# Patient Record
Sex: FEMALE | Race: BLACK OR AFRICAN AMERICAN | Employment: FULL TIME | URBAN - METROPOLITAN AREA
[De-identification: names, ages, dates, MRNs, and addresses within clinical notes are randomized per-mention and may not be internally consistent; named-entity substitution may affect disease eponyms.]

---

## 2017-09-16 ENCOUNTER — HOSPITAL ENCOUNTER (OUTPATIENT)
Dept: MAMMOGRAPHY | Age: 42
Discharge: HOME | End: 2017-09-16
Attending: PHYSICIAN ASSISTANT
Payer: COMMERCIAL

## 2017-09-16 DIAGNOSIS — Z12.31 VISIT FOR SCREENING MAMMOGRAM: ICD-10-CM

## 2017-09-16 PROCEDURE — 77067 SCR MAMMO BI INCL CAD: CPT

## 2018-08-09 ENCOUNTER — OFFICE VISIT (OUTPATIENT)
Dept: FAMILY MEDICINE CLINIC | Age: 43
End: 2018-08-09

## 2018-08-09 VITALS
WEIGHT: 262 LBS | RESPIRATION RATE: 14 BRPM | HEIGHT: 67 IN | DIASTOLIC BLOOD PRESSURE: 84 MMHG | TEMPERATURE: 98.7 F | BODY MASS INDEX: 41.12 KG/M2 | OXYGEN SATURATION: 97 % | HEART RATE: 71 BPM | SYSTOLIC BLOOD PRESSURE: 118 MMHG

## 2018-08-09 DIAGNOSIS — R10.84 GENERALIZED ABDOMINAL PAIN: Primary | ICD-10-CM

## 2018-08-09 DIAGNOSIS — C06.9 ORAL CANCER (HCC): ICD-10-CM

## 2018-08-09 DIAGNOSIS — Z13.220 SCREENING CHOLESTEROL LEVEL: ICD-10-CM

## 2018-08-09 DIAGNOSIS — M25.461 KNEE EFFUSION, RIGHT: ICD-10-CM

## 2018-08-09 DIAGNOSIS — Z13.1 SCREENING FOR DIABETES MELLITUS: ICD-10-CM

## 2018-08-09 DIAGNOSIS — Z87.19 H/O HERNIA REPAIR: ICD-10-CM

## 2018-08-09 DIAGNOSIS — E66.9 OBESITY, UNSPECIFIED CLASSIFICATION, UNSPECIFIED OBESITY TYPE, UNSPECIFIED WHETHER SERIOUS COMORBIDITY PRESENT: ICD-10-CM

## 2018-08-09 DIAGNOSIS — Z98.890 H/O HERNIA REPAIR: ICD-10-CM

## 2018-08-09 PROBLEM — E66.01 OBESITY, MORBID (HCC): Status: ACTIVE | Noted: 2018-08-09

## 2018-08-09 NOTE — PROGRESS NOTES
Chief Complaint   Patient presents with    New Patient    Abdominal Pain     upper abdominal pain when lifting or pulling; h/o hernia repair     Knee Pain     right; dx with Baker's cyst x 8 months ago      1. Have you been to the ER, urgent care clinic since your last visit? Hospitalized since your last visit? No    2. Have you seen or consulted any other health care providers outside of the [facility] since your last visit? Include any pap smears or colon screening.  No

## 2018-08-09 NOTE — MR AVS SNAPSHOT
[facility] [address] 
[phone] Patient: Moi Ríos MRN: [id number] TNY:[birth year] Visit Information Date & Time Provider Department Dept. Phone Encounter #  
 8/9/2018  3:00 PM Miguel An, [address] [place] [address] Road [id number] Follow-up Instructions Return in about 3 months (around 11/9/2018) for physical.  Fasting labs due 3-7 days prior to appointment . Upcoming Health Maintenance Date Due DTaP/Tdap/Td series (1 - Tdap) 3/3/1996 PAP AKA CERVICAL CYTOLOGY 3/3/1996 Influenza Age 5 to Adult 8/1/2018 BREAST CANCER SCRN MAMMOGRAM 9/16/2018 Allergies as of 8/9/2018  Review Complete On: 8/9/2018 By: Miguel An MD  
  
 Severity Noted Reaction Type Reactions Pcn [Penicillins]  08/09/2018    Hives Current Immunizations  Never Reviewed No immunizations on file. Not reviewed this visit You Were Diagnosed With   
  
 Codes Comments Generalized abdominal pain    -  Primary ICD-10-CM: R10.84 ICD-9-CM: 789.07   
 H/O hernia repair     ICD-10-CM: Z98.890, Z87.19 ICD-9-CM: V45.89 Knee effusion, right     ICD-10-CM: M25.461 ICD-9-CM: 719.06 Obesity, unspecified classification, unspecified obesity type, unspecified whether serious comorbidity present     ICD-10-CM: E66.9 ICD-9-CM: 278.00 Oral cancer ([facility] [address]     ICD-10-CM: C06.9 ICD-9-CM: 145.9 Screening cholesterol level     ICD-10-CM: D62.867 ICD-9-CM: V77.91 Screening for diabetes mellitus     ICD-10-CM: Z13.1 ICD-9-CM: V77.1 Vitals BP Pulse Temp Resp Height(growth percentile) Weight(growth percentile) 118/84 (BP 1 Location: Left arm, BP Patient Position: Sitting) 71 98.7 °F (37.1 °C) (Oral) 14 5' 6.5\" (1.689 m) 262 lb (118.8 kg) LMP SpO2 BMI OB Status Smoking Status 07/26/2018 (Exact Date) 97% 41.65 kg/m2 Having regular periods Never Smoker Vitals History BMI and BSA Data Body Mass Index Body Surface Area  
 41.65 kg/m 2 2.36 m 2 Preferred Pharmacy Pharmacy Name Phone [id number] Gerard Meyer, [address] [facility] [place], [place],[address] [facility] [phone] Your Updated Medication List  
  
Notice  As of 8/9/2018  4:00 PM  
 You have not been prescribed any medications. We Performed the Following REFERRAL TO GENERAL SURGERY [REF27 Custom] Follow-up Instructions Return in about 3 months (around 11/9/2018) for physical.  Fasting labs due 3-7 days prior to appointment . To-Do List   
 08/09/2018 Lab:  CBC W/O DIFF   
  
 08/09/2018 Lab:  HEMOGLOBIN A1C W/O EAG   
  
 08/09/2018 Lab:  LIPID PANEL   
  
 08/09/2018 Lab:  METABOLIC PANEL, COMPREHENSIVE Referral Information Referral ID Referred By Referred To  
  
 [id number] David Kirkland MD   
   [facility] [address] [place] [address] Phone: [phone] Fax: [phone] Visits Status Start Date End Date 1 New Request 8/9/18 8/9/19 If your referral has a status of pending review or denied, additional information will be sent to support the outcome of this decision. Patient Instructions A Healthy Lifestyle: Care Instructions Your Care Instructions A healthy lifestyle can help you feel good, stay at a healthy weight, and have plenty of energy for both work and play. A healthy lifestyle is something you can share with your whole family. A healthy lifestyle also can lower your risk for serious health problems, such as high blood pressure, heart disease, and diabetes. You can follow a few steps listed below to improve your health and the health of your family. Follow-up care is a key part of your treatment and safety.  Be sure to make and go to all appointments, and call your doctor if you are having problems. It's also a good idea to know your test results and keep a list of the medicines you take. How can you care for yourself at home? · Do not eat too much sugar, fat, or fast foods. You can still have dessert and treats now and then. The goal is moderation. · Start small to improve your eating habits. Pay attention to portion sizes, drink less juice and soda pop, and eat more fruits and vegetables. ¨ Eat a healthy amount of food. A 3-ounce serving of meat, for example, is about the size of a deck of cards. Fill the rest of your plate with vegetables and whole grains. ¨ Limit the amount of soda and sports drinks you have every day. Drink more water when you are thirsty. ¨ Eat at least 5 servings of fruits and vegetables every day. It may seem like a lot, but it is not hard to reach this goal. A serving or helping is 1 piece of fruit, 1 cup of vegetables, or 2 cups of leafy, raw vegetables. Have an apple or some carrot sticks as an afternoon snack instead of a candy bar. Try to have fruits and/or vegetables at every meal. 
· Make exercise part of your daily routine. You may want to start with simple activities, such as walking, bicycling, or slow swimming. Try to be active 30 to 60 minutes every day. You do not need to do all 30 to 60 minutes all at once. For example, you can exercise 3 times a day for 10 or 20 minutes. Moderate exercise is safe for most people, but it is always a good idea to talk to your doctor before starting an exercise program. 
· Keep moving. Gee Leavittus the lawn, work in the garden, or [employer]. Take the stairs instead of the elevator at work. · If you smoke, quit. People who smoke have an increased risk for heart attack, stroke, cancer, and other lung illnesses. Quitting is hard, but there are ways to boost your chance of quitting tobacco for good. ¨ Use nicotine gum, patches, or lozenges. ¨ Ask your doctor about stop-smoking programs and medicines. ¨ Keep trying. In addition to reducing your risk of diseases in the future, you will notice some benefits soon after you stop using tobacco. If you have shortness of breath or asthma symptoms, they will likely get better within a few weeks after you quit. · Limit how much alcohol you drink. Moderate amounts of alcohol (up to 2 drinks a day for men, 1 drink a day for women) are okay. But drinking too much can lead to liver problems, high blood pressure, and other health problems. Family health If you have a family, there are many things you can do together to improve your health. · Eat meals together as a family as often as possible. · Eat healthy foods. This includes fruits, vegetables, lean meats and dairy, and whole grains. · Include your family in your fitness plan. Most people think of activities such as jogging or tennis as the way to fitness, but there are many ways you and your family can be more active. Anything that makes you breathe hard and gets your heart pumping is exercise. Here are some tips: 
¨ Walk to do errands or to take your child to school or the bus. ¨ Go for a family bike ride after dinner instead of watching TV. Where can you learn more? Go to http://jey-kathie.info/. Enter T009 in the search box to learn more about \"A Healthy Lifestyle: Care Instructions. \" Current as of: December 7, 2017 Content Version: 11.7 © 6121-7563 [employer]. Care instructions adapted under license by [employer] (which disclaims liability or warranty for this information). If you have questions about a medical condition or this instruction, always ask your healthcare professional. [place] [address] any warranty or liability for your use of this information. [address], [address] [address] [address] Phone: [phone] Introducing [facility]!    
 [facility] introduces [facility] patient portal. Now you can access parts of your medical record, email your doctor's office, and request medication refills online. 1. In your internet browser, go to [email or web address] 2. Click on the First Time User? Click Here link in the Sign In box. You will see the New Member Sign Up page. 3. Enter your [employer] Access Code exactly as it appears below. You will not need to use this code after youve completed the sign-up process. If you do not sign up before the expiration date, you must request a new code. · [employer] Access Code: [email or web address] Expires: 11/7/2018  2:58 PM 
 
4. Enter the last four digits of your Social Security Number (xxxx) and Date of Birth (mm/dd/yyyy) as indicated and click Submit. You will be taken to the next sign-up page. 5. Create a [employer] ID. This will be your [employer] login ID and cannot be changed, so think of one that is secure and easy to remember. 6. Create a [employer] password. You can change your password at any time. 7. Enter your Password Reset Question and Answer. This can be used at a later time if you forget your password. 8. Enter your e-mail address. You will receive e-mail notification when new information is available in [address] 9. Click Sign Up. You can now view and download portions of your medical record. 10. Click the Download Summary menu link to download a portable copy of your medical information. If you have questions, please visit the Frequently Asked Questions section of the [employer] website. Remember, [employer] is NOT to be used for urgent needs. For medical emergencies, dial 911. Now available from your iPhone and Android! Please provide this summary of care documentation to your next provider. Your primary care clinician is listed as Amaliaoll Sol. If you have any questions after today's visit, please call [phone].

## 2018-08-09 NOTE — PATIENT INSTRUCTIONS

## 2018-08-10 NOTE — PROGRESS NOTES
SUBJECTIVE  Chief Complaint   Patient presents with    New Patient    Abdominal Pain     upper abdominal pain when lifting or pulling; h/o hernia repair     Knee Pain     right; dx with Baker's cyst x 8 months ago      Patient presents for a few issues and to establish care. Patient presents complaining of a several week onset of left sided upper abdominal pain. Quality: pulling, dull, intermittent  Radiation: denies   Aggravating factors: when lifting or exerting herself. No chest pain or dyspnea. Relieving factors: rest  Has tried: avoiding aggravating activities   History of abdominal pain: yes, had 2 hernia repairs in the area of her pain and at that time had the exact symptoms. History of abdominal surgery: hernia repairs  The patient denies any melena or hematochezia. The patient denies diarrhea or constipation. The patient denies any nausea or vomiting. The patient denies any fevers. The patient presents complaining of an 8 month history of right knee pain. Location: entire knee  Quality: sore, swollen  Injury: denies   Duration: several months   Radiation: denies   Swelling: yes  Instability: is sometimes giving out, thus feeling a loss of coordination   Locking: denies   Aggravating factors: too much activity   Relieving factors: rest  Has tried: went to urgent care and had films. Also saw her prior PCP. Had an injection with some relief. Past Medical History:   Diagnosis Date    H/O oral cancer 2016    Obesity      No current outpatient prescriptions on file.     Allergies   Allergen Reactions    Pcn [Penicillins] Hives     Past Surgical History:   Procedure Laterality Date    HX  SECTION      HX HEENT      Tumor removed from mouth - Dr. Terrie Kenyon, Dr. Marsha Coreas, 2007    used mesh second time, does not recall surgery    HX TONSILLECTOMY       Social History     Social History    Marital status:      Spouse name: N/A  Number of children: N/A    Years of education: N/A     Occupational History          Social History Main Topics    Smoking status: Never Smoker    Smokeless tobacco: Never Used    Alcohol use Yes      Comment: rarely    Drug use: Not on file    Sexual activity: Yes     Partners: Male     Other Topics Concern    Not on file     Social History Narrative    No narrative on file     History reviewed. No pertinent family history. ROS:  Complete 10 system ROS is obtained from the patient intake forms which are reviewed with the patient. The intake H&P is scanned in for the chart. OBJECTIVE    Blood pressure 118/84, pulse 71, temperature 98.7 °F (37.1 °C), temperature source Oral, resp. rate 14, height 5' 6.5\" (1.689 m), weight 262 lb (118.8 kg), last menstrual period 07/26/2018, SpO2 97 %. General:  Alert, cooperative, well appearing, in no apparent distress. Head:  Head is symmetric, normocephalic without evidence of trauma or deformity. Eyes:  Pupils are equally round and reactive to light with accommodation. The extra-ocular movements are intact. The lids are without swelling, lesions, or drainage. The conjunctiva are clear and noninjected. ENT:  The external auditory canals are without swelling or drainage. The nasal mucosa is pink without drainage. The tongue and mucous membranes are pink and moist without lesions. Neck:  There is normal range of motion. The thyroid is normal size without nodules or masses. There is no lymphadenopathy. There are no carotid bruits. CV:  The heart sounds are regular in rate and rhythm. There is a normal S1 and S2. There or no murmurs. Lungs: Inspiratory and expiratory efforts are full and unlabored. Lung sounds are clear and equal to auscultation throughout all lung fields without wheezing, rales, or rhonchi. Abd: soft, nontender, nondistended. No masses.   When she starts to sit up from a supine position, she has some mild swelling in the area of her pain that is to the left of the midline in her epigastric / LUQ area. Skin:  No rashes, no jaundice. Right knee:  Trace effusion. Nontender. No laxity. Gait is normal.    Psych: normal affect. Mood good. Oriented x 3. Judgement and insight intact. ASSESSMENT / PLAN    ICD-10-CM ICD-9-CM    1. Generalized abdominal pain R10.84 789.07 REFERRAL TO GENERAL SURGERY      CBC W/O DIFF      METABOLIC PANEL, COMPREHENSIVE   2. H/O hernia repair Z98.890 V45.89 REFERRAL TO GENERAL SURGERY    Z87.19     3. Knee effusion, right M25.461 719.06    4. Obesity, unspecified classification, unspecified obesity type, unspecified whether serious comorbidity present E66.9 278.00 HEMOGLOBIN A1C W/O EAG      LIPID PANEL   5. Oral cancer (HCC) C06.9 145.9    6. Screening cholesterol level Z13.220 V77.91 LIPID PANEL   7. Screening for diabetes mellitus Z13.1 V77.1 HEMOGLOBIN A1C W/O EAG     Abd pain - possibly due to recurrent hernia. Will refer for a surgery opinion. Knee effusion - likely the best initial option since her corticosteroid injection was not providing sustained relief, she should work on quad strengthening, weight loss, and home modalities like ice. I offered her formal PT or orthopedic referrals as well. A neoprene knee sleeve may be worthwhile. HEP handout provided. Obesity - diet and exercise. History of oral cancer - stated she has had an oral lesion removed that was cancerous and is seeing [facility]. We are requesting notes for review to clarify the diagnosis. Screening labs ordered. All chart history elements were reviewed by me at the time of the visit even though marked at time of note closure. Patient understands our medical plan. Patient has provided input and agrees with goals. Alternatives have been explained and offered. All questions answered. The patient is to call if condition worsens or fails to improve.      Follow-up Disposition:  Return in about 3 months (around 11/9/2018) for physical.  Fasting labs due 3-7 days prior to appointment .

## 2018-08-17 ENCOUNTER — OFFICE VISIT (OUTPATIENT)
Dept: SURGERY | Age: 43
End: 2018-08-17

## 2018-08-17 VITALS
RESPIRATION RATE: 16 BRPM | HEIGHT: 65 IN | OXYGEN SATURATION: 98 % | DIASTOLIC BLOOD PRESSURE: 74 MMHG | TEMPERATURE: 98.2 F | BODY MASS INDEX: 43.32 KG/M2 | HEART RATE: 76 BPM | SYSTOLIC BLOOD PRESSURE: 126 MMHG | WEIGHT: 260 LBS

## 2018-08-17 DIAGNOSIS — R10.33 PERIUMBILICAL ABDOMINAL PAIN: Primary | ICD-10-CM

## 2018-08-17 NOTE — PROGRESS NOTES
1. Have you been to the ER, urgent care clinic since your last visit? Hospitalized since your last visit? No    2. Have you seen or consulted any other health care providers outside of the [address] [place] [address] since your last visit? Include any pap smears or colon screening.  No

## 2018-08-17 NOTE — PROGRESS NOTES
General Surgery Consult      Humaira Marcus  Admit date: (Not on file)    MRN: [id number]     : 1975     Age: 37 y.o. Attending Physician: Juliana Saavedra MD [facility]      History of Present Illness:     Humaira Marcus is a 37 y.o. female was referred  for evaluation of a possible umbilical hernia. The patient stated that in 36 she had an open umbilical hernia repair with no mesh. However it seems that the hernia did recur and 4-5 years later she had the same hernia repaired with a mesh by the same surgeon in [place]. The patient stated that she has been doing relatively well. However about 2-3 months ago she started feeling pain at the site of the hernia. The pain is mainly localized at the hernia site but sometimes it does radiate to the left side. The pain happens daily especially with movement or lifting at work. She denies any nausea or vomiting. She denies any change in bowel habits. The patient did not have a CT scan recently. 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 Peg Yi then full excision by Dr. 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 No current outpatient prescriptions on file.      No current facility-administered medications for this visit. Allergies   Allergen Reactions    Pcn [Penicillins] Hives        Review of Systems:  Constitutional: negative  Eyes: negative  Ears, Nose, Mouth, Throat, and Face: negative  Respiratory: negative  Cardiovascular: negative  Gastrointestinal: positive for abdominal pain  Genitourinary:negative  Integument/Breast: negative  Hematologic/Lymphatic: negative  Musculoskeletal:negative  Neurological: negative  Behavioral/Psychiatric: negative  Endocrine: negative  Allergic/Immunologic: negative    Objective:     Visit Vitals    /74    Pulse 76    Temp 98.2 °F (36.8 °C)    Resp 16    Ht 5' 5\" (1.651 m)    Wt 117.9 kg (260 lb)    LMP 08/12/2018    SpO2 98%    BMI 43.27 kg/m2       Physical Exam:      General:  in no apparent distress, alert, oriented times 3, afebrile and cooperative   Eyes:  conjunctivae and sclerae normal, pupils equal, round, reactive to light   Throat & Neck: no erythema or exudates noted and neck supple and symmetrical; no palpable masses   Lungs:   clear to auscultation bilaterally   Heart:  Regular rate and rhythm   Abdomen:   rounded and obese, soft, nontender, nondistended, no masses or organomegaly. There is mild tenderness at the site of the scar in the periumbilical area. I could not feel a clear hernia or defect. .    Extremities: extremities normal, atraumatic, no cyanosis or edema   Skin: Normal.       Imaging and Lab Review:     CBC: No results found for: WBC, RBC, HGB, HCT, PLT, HGBEXT, HCTEXT, PLTEXT  BMP: No results found for: GLU, NA, K, CL, CO2, BUN, CREA, CA  CMP:No results found for: GLU, NA, K, CL, CO2, BUN, CREA, CA, AGAP, BUCR, TBIL, GPT, AP, TP, ALB, GLOB, AGRAT    No results found for this or any previous visit (from the past 24 hour(s)).     images and reports reviewed    Assessment:   Sekou Raymundo is a 37 y.o. female is presenting with a picture of abdominal pain and possible recurrence of her umbilical hernia. I explained to the patient that we need to do a CT scan of abdomen and pelvis to better define if the patient has a recurrence of the hernia. Also I advised the patient to follow-up with her primary care physician for a possible need for a GI consult. Plan:     I will order a CT scan of abdomen and pelvis to rule out recurrent umbilical hernia.   Consider a GI consult for endoscopy  Follow-up with me after the result of her CT scan    Please call me if you have any questions (cell phone: [phone])     Signed By: Danita Jerez MD     August 17, 2018

## 2018-08-27 ENCOUNTER — TELEPHONE (OUTPATIENT)
Dept: SURGERY | Age: 43
End: 2018-08-27

## 2018-08-27 DIAGNOSIS — R10.33 PERIUMBILICAL ABDOMINAL PAIN: Primary | ICD-10-CM

## 2018-08-30 ENCOUNTER — HOSPITAL ENCOUNTER (OUTPATIENT)
Dept: CT IMAGING | Age: 43
Discharge: HOME | End: 2018-08-30
Attending: SURGERY
Payer: COMMERCIAL

## 2018-08-30 DIAGNOSIS — R10.33 PERIUMBILICAL ABDOMINAL PAIN: ICD-10-CM

## 2018-08-30 PROCEDURE — 74177 CT ABD & PELVIS W/CONTRAST: CPT

## 2018-08-30 PROCEDURE — 74011636320 HC RX REV CODE- 636/320: Performed by: SURGERY

## 2018-08-30 RX ADMIN — IOPAMIDOL 100 ML: 612 INJECTION, SOLUTION INTRAVENOUS at 09:00

## 2018-09-12 ENCOUNTER — TELEPHONE (OUTPATIENT)
Dept: SURGERY | Age: 43
End: 2018-09-12

## 2018-09-12 NOTE — TELEPHONE ENCOUNTER
Spoke with Ms. Alanis Torres to schedule a follow up appointment with Dr. Rica Garcia on Monday, September 17, 2018 to discuss CT results and plan of treatment.

## 2018-09-17 ENCOUNTER — DOCUMENTATION ONLY (OUTPATIENT)
Dept: SURGERY | Age: 43
End: 2018-09-17

## 2018-09-17 ENCOUNTER — TELEPHONE (OUTPATIENT)
Dept: SURGERY | Age: 43
End: 2018-09-17

## 2018-09-17 ENCOUNTER — OFFICE VISIT (OUTPATIENT)
Dept: SURGERY | Age: 43
End: 2018-09-17

## 2018-09-17 VITALS
HEART RATE: 92 BPM | DIASTOLIC BLOOD PRESSURE: 72 MMHG | RESPIRATION RATE: 18 BRPM | SYSTOLIC BLOOD PRESSURE: 116 MMHG | HEIGHT: 65 IN | TEMPERATURE: 98.1 F | WEIGHT: 261 LBS | BODY MASS INDEX: 43.49 KG/M2

## 2018-09-17 DIAGNOSIS — K43.2 INCISIONAL HERNIA, WITHOUT OBSTRUCTION OR GANGRENE: Primary | ICD-10-CM

## 2018-09-17 DIAGNOSIS — R10.33 PERIUMBILICAL ABDOMINAL PAIN: ICD-10-CM

## 2018-09-17 NOTE — PROGRESS NOTES
PATIENT PRE AND POST OPERATIVE INSTRUCTIONS     [address] [place] [address]  Maulik Allen [facility] [address]  [phone]    Before Surgery Instructions:   1) You must have someone available to drive you to and from your procedure and stay with you for the first 24 hours. 2) It is very important that you have nothing to eat or drink after midnight the night before your surgery. This includes chewing gum or sucking on hard candy. Take only heart, blood pressure and cholesterol medications the morning of surgery with only a sip of water. 3) Please stop taking Plavix 10 days prior to your surgery. Stop taking Coumadin 5 days prior to your surgery. Stop taking all Aspirin or Aspirin containing products 7 days prior to your surgery. Stop taking Advil, Motrin, Aleve, and etc. 3 days prior to your surgery. 4) If you take any diabetic medications please consult with your primary care physician on how to take them on the day of your surgery  5) Please stop all Herbal products 2 weeks prior to your surgery. 6) Please arrive at the hospital 2 hours prior to your surgery, unless you have been otherwise instructed. 7) Patients having an operation on their colon will be given a separate instruction sheet on their Bowel Prep. 8) For any pre-operative work up check in at the main entrance to [address] [place] [address], and then go to Patient Registration. These studies are done on a walk in basis they are open from 7:00am to 5:00pm Monday through Friday. 9) Please wash your surgical site the morning of your surgery with soap and water. 10) If you are of child bearing age you will have pregnancy test done the morning of your surgery as soon as you arrive. 11) You may be contacted to change your surgery time. At times this is necessary due to equipment or staffing needs. After Surgery Instructions: You will need to be seen in the office for a follow-up visit 7-14 days after your surgery.  Please call after you have had the procedure to make this appointment. Unless otherwise instructed, you may remove your outer bandage and shower 48 hours after your surgery. If you develop a fever greater than 101, have any significant drainage, bleeding, swelling and/or pus of the wound. Please call our office immediately. Surgery Date and Time:  Tuesday, October 16, 2018 at 11:15am    Please check in at [facility], enter through the Emergency Room entrance and go up to the second floor. Please check in by 9:15am the day of your surgery. You may contact Fang Pichardo with any questions at [phone].

## 2018-09-17 NOTE — PROGRESS NOTES
General Surgery Consult      Isadora Walker  Admit date: (Not on file)    MRN: [id number]     : 1975     Age: 37 y.o. Attending Physician: Nash Davidson MD, [facility]      History of Present Illness:     Isadora Walker is a 37 y.o. female who is very well-known to me I saw the patient recently because of abdominal pain around the periumbilical hernia area . The patient had 2 umbilical hernia repair in the past and she continued to have abdominal pain around the umbilicus with the bulge. I performed a CT scan that showed recurrence of the hernia so she is here today to discuss the surgery. The patient is still complaining of abdominal pain around the periumbilical area especially with movement. She also has constipation but denies nausea or vomiting. 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 Barbara Lerma then full excision by Dr. 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 No current outpatient prescriptions on file. No current facility-administered medications for this visit.        Allergies   Allergen Reactions    Pcn [Penicillins] Hives        Review of Systems:  Pertinent items are noted in the History of Present Illness. Objective:     Visit Vitals    /72    Pulse 92    Temp 98.1 °F (36.7 °C)    Resp 18    Ht 5' 5\" (1.651 m)    Wt 118.4 kg (261 lb)    BMI 43.43 kg/m2       Physical Exam:      General:  in no apparent distress, alert, oriented times 3 and afebrile   Eyes:  conjunctivae and sclerae normal, pupils equal, round, reactive to light   Throat & Neck: no erythema or exudates noted and neck supple and symmetrical; no palpable masses   Lungs:   clear to auscultation bilaterally   Heart:  Regular rate and rhythm   Abdomen:   rounded, soft, nontender, nondistended, no masses or organomegaly. There is a tender hernia located about 1-2 cm above the umbilicus consistent with the hernia identified on the CT scan. Of note, this mass was not as much felt as when I saw her last time. Extremities: extremities normal, atraumatic, no cyanosis or edema   Skin: Normal.       Imaging and Lab Review:     CBC: No results found for: WBC, RBC, HGB, HCT, PLT, HGBEXT, HCTEXT, PLTEXT  BMP: No results found for: GLU, NA, K, CL, CO2, BUN, CREA, CA  CMP:No results found for: GLU, NA, K, CL, CO2, BUN, CREA, CA, AGAP, BUCR, TBIL, GPT, AP, TP, ALB, GLOB, AGRAT    No results found for this or any previous visit (from the past 24 hour(s)). images and reports reviewed    Assessment:   Brnadee Wu is a 37 y.o. female is presenting with a picture of symptomatic recurrent incisional hernia. I Discussed the possibility of incarceration, strangulation, enlargement in size over time, and the risk of emergency surgery in the face of strangulation. I also discussed the use of prosthetic materials (mesh), including the risk of infection. Also discussed the risk of surgery including recurrence and the possible need for reoperation and removal of mesh if used, possibility of postoperative small bowel injury, obstruction or ileus, and the risks of general anesthetic.  I explained to the the patient about the robotic hernia repair procedure. Plan:     1. Schedule for robotic recurrent incisional hernia repair with placement of mesh. 2. No heavy lifting for 2 weeks after the surgery (More than 15 pounds)  3. Avoid constipation by taking stool softener.      Please call me if you have any questions (cell phone: [phone])     Signed By: Yamileth Nevarez MD     September 17, 2018

## 2018-09-17 NOTE — TELEPHONE ENCOUNTER
Spoke with Mrs. Aayush Badillo to review surgery instructions/schedule surgery (robotic recurrent incisional hernia repair with mesh). Mrs. Aayush Badillo request to schedule her surgery at [facility]. Surgery was scheduled for Tuesday, October 16, 2018 at 1:00 pm with Dr. Karen Coleman. Ms. Aayush Badillo verbalized she understood all instructions.

## 2018-09-19 ENCOUNTER — TELEPHONE (OUTPATIENT)
Dept: SURGERY | Age: 43
End: 2018-09-19

## 2018-09-19 NOTE — TELEPHONE ENCOUNTER
Ms. Nela Griffon called requesting to reschedule her surgery to an earlier date if possible.   Surgery was rescheduled to Thursday, October 4, 2018 at 11:00am at [facility] with a 9:00am arrival.

## 2018-10-03 ENCOUNTER — ANESTHESIA EVENT (OUTPATIENT)
Dept: SURGERY | Age: 43
End: 2018-10-03
Payer: COMMERCIAL

## 2018-10-04 ENCOUNTER — HOSPITAL ENCOUNTER (OUTPATIENT)
Age: 43
Setting detail: OUTPATIENT SURGERY
Discharge: HOME | End: 2018-10-04
Attending: SURGERY | Admitting: SURGERY
Payer: COMMERCIAL

## 2018-10-04 ENCOUNTER — ANESTHESIA (OUTPATIENT)
Dept: SURGERY | Age: 43
End: 2018-10-04
Payer: COMMERCIAL

## 2018-10-04 VITALS
TEMPERATURE: 98.7 F | HEIGHT: 67 IN | SYSTOLIC BLOOD PRESSURE: 140 MMHG | HEART RATE: 79 BPM | OXYGEN SATURATION: 95 % | WEIGHT: 261.25 LBS | RESPIRATION RATE: 16 BRPM | DIASTOLIC BLOOD PRESSURE: 80 MMHG | BODY MASS INDEX: 41 KG/M2

## 2018-10-04 DIAGNOSIS — Z87.19 S/P HERNIA REPAIR: Primary | ICD-10-CM

## 2018-10-04 DIAGNOSIS — Z98.890 S/P HERNIA REPAIR: Primary | ICD-10-CM

## 2018-10-04 LAB — HCG UR QL: NEGATIVE

## 2018-10-04 PROCEDURE — 76060000033 HC ANESTHESIA 1 TO 1.5 HR: Performed by: SURGERY

## 2018-10-04 PROCEDURE — 77030032490 HC SLV COMPR SCD KNE COVD -B: Performed by: SURGERY

## 2018-10-04 PROCEDURE — 76010000934 HC OR TIME 1 TO 1.5HR INTENSV - TIER 2: Performed by: SURGERY

## 2018-10-04 PROCEDURE — 77030031139 HC SUT VCRL2 J&J -A: Performed by: SURGERY

## 2018-10-04 PROCEDURE — 76210000006 HC OR PH I REC 0.5 TO 1 HR: Performed by: SURGERY

## 2018-10-04 PROCEDURE — 77030020703 HC SEAL CANN DISP INTU -B: Performed by: SURGERY

## 2018-10-04 PROCEDURE — 74011000250 HC RX REV CODE- 250: Performed by: SURGERY

## 2018-10-04 PROCEDURE — 77030035277 HC OBTRTR BLDELSS DISP INTU -B: Performed by: SURGERY

## 2018-10-04 PROCEDURE — C1781 MESH (IMPLANTABLE): HCPCS | Performed by: SURGERY

## 2018-10-04 PROCEDURE — 74011250636 HC RX REV CODE- 250/636

## 2018-10-04 PROCEDURE — 74011250636 HC RX REV CODE- 250/636: Performed by: NURSE ANESTHETIST, CERTIFIED REGISTERED

## 2018-10-04 PROCEDURE — 74011250636 HC RX REV CODE- 250/636: Performed by: SURGERY

## 2018-10-04 PROCEDURE — 77030010507 HC ADH SKN DERMBND J&J -B: Performed by: SURGERY

## 2018-10-04 PROCEDURE — 74011250637 HC RX REV CODE- 250/637: Performed by: NURSE ANESTHETIST, CERTIFIED REGISTERED

## 2018-10-04 PROCEDURE — 77030018842 HC SOL IRR SOD CL 9% BAXT -A: Performed by: SURGERY

## 2018-10-04 PROCEDURE — 74011000272 HC RX REV CODE- 272: Performed by: SURGERY

## 2018-10-04 PROCEDURE — 74011000250 HC RX REV CODE- 250

## 2018-10-04 PROCEDURE — 81025 URINE PREGNANCY TEST: CPT

## 2018-10-04 PROCEDURE — 76210000020 HC REC RM PH II FIRST 0.5 HR: Performed by: SURGERY

## 2018-10-04 PROCEDURE — 77030002933 HC SUT MCRYL J&J -A: Performed by: SURGERY

## 2018-10-04 PROCEDURE — 77030022704 HC SUT VLOC COVD -B: Performed by: SURGERY

## 2018-10-04 DEVICE — VENTRALIGHT ST MESH, 4" X 6" (10.2 CM X 15.2 CM), ELLIPSE
Type: IMPLANTABLE DEVICE | Site: ABDOMEN | Status: FUNCTIONAL
Brand: VENTRALIGHT

## 2018-10-04 RX ORDER — LIDOCAINE HYDROCHLORIDE 20 MG/ML
INJECTION, SOLUTION EPIDURAL; INFILTRATION; INTRACAUDAL; PERINEURAL AS NEEDED
Status: DISCONTINUED | OUTPATIENT
Start: 2018-10-04 | End: 2018-10-04 | Stop reason: HOSPADM

## 2018-10-04 RX ORDER — CLINDAMYCIN PHOSPHATE 900 MG/50ML
900 INJECTION INTRAVENOUS ONCE
Status: COMPLETED | OUTPATIENT
Start: 2018-10-04 | End: 2018-10-04

## 2018-10-04 RX ORDER — SUCCINYLCHOLINE CHLORIDE 20 MG/ML
INJECTION INTRAMUSCULAR; INTRAVENOUS AS NEEDED
Status: DISCONTINUED | OUTPATIENT
Start: 2018-10-04 | End: 2018-10-04 | Stop reason: HOSPADM

## 2018-10-04 RX ORDER — BUPIVACAINE HYDROCHLORIDE AND EPINEPHRINE 5; 5 MG/ML; UG/ML
INJECTION, SOLUTION EPIDURAL; INTRACAUDAL; PERINEURAL AS NEEDED
Status: DISCONTINUED | OUTPATIENT
Start: 2018-10-04 | End: 2018-10-04 | Stop reason: HOSPADM

## 2018-10-04 RX ORDER — FENTANYL CITRATE 50 UG/ML
25 INJECTION, SOLUTION INTRAMUSCULAR; INTRAVENOUS AS NEEDED
Status: DISCONTINUED | OUTPATIENT
Start: 2018-10-04 | End: 2018-10-04 | Stop reason: HOSPADM

## 2018-10-04 RX ORDER — OXYCODONE AND ACETAMINOPHEN 5; 325 MG/1; MG/1
1 TABLET ORAL AS NEEDED
Status: DISCONTINUED | OUTPATIENT
Start: 2018-10-04 | End: 2018-10-04 | Stop reason: HOSPADM

## 2018-10-04 RX ORDER — OXYCODONE AND ACETAMINOPHEN 5; 325 MG/1; MG/1
1 TABLET ORAL
Qty: 24 TAB | Refills: 0 | Status: SHIPPED | OUTPATIENT
Start: 2018-10-04 | End: 2022-09-28 | Stop reason: ALTCHOICE

## 2018-10-04 RX ORDER — GLYCOPYRROLATE 0.2 MG/ML
INJECTION INTRAMUSCULAR; INTRAVENOUS AS NEEDED
Status: DISCONTINUED | OUTPATIENT
Start: 2018-10-04 | End: 2018-10-04 | Stop reason: HOSPADM

## 2018-10-04 RX ORDER — PROPOFOL 10 MG/ML
INJECTION, EMULSION INTRAVENOUS AS NEEDED
Status: DISCONTINUED | OUTPATIENT
Start: 2018-10-04 | End: 2018-10-04 | Stop reason: HOSPADM

## 2018-10-04 RX ORDER — FENTANYL CITRATE 50 UG/ML
INJECTION, SOLUTION INTRAMUSCULAR; INTRAVENOUS AS NEEDED
Status: DISCONTINUED | OUTPATIENT
Start: 2018-10-04 | End: 2018-10-04 | Stop reason: HOSPADM

## 2018-10-04 RX ORDER — NEOSTIGMINE METHYLSULFATE 5 MG/5 ML
SYRINGE (ML) INTRAVENOUS AS NEEDED
Status: DISCONTINUED | OUTPATIENT
Start: 2018-10-04 | End: 2018-10-04 | Stop reason: HOSPADM

## 2018-10-04 RX ORDER — DEXTROSE MONOHYDRATE 25 G/50ML
25-50 INJECTION, SOLUTION INTRAVENOUS AS NEEDED
Status: DISCONTINUED | OUTPATIENT
Start: 2018-10-04 | End: 2018-10-04 | Stop reason: HOSPADM

## 2018-10-04 RX ORDER — FENTANYL CITRATE 50 UG/ML
50 INJECTION, SOLUTION INTRAMUSCULAR; INTRAVENOUS
Status: COMPLETED | OUTPATIENT
Start: 2018-10-04 | End: 2018-10-04

## 2018-10-04 RX ORDER — ONDANSETRON 2 MG/ML
INJECTION INTRAMUSCULAR; INTRAVENOUS AS NEEDED
Status: DISCONTINUED | OUTPATIENT
Start: 2018-10-04 | End: 2018-10-04 | Stop reason: HOSPADM

## 2018-10-04 RX ORDER — MIDAZOLAM HYDROCHLORIDE 1 MG/ML
INJECTION, SOLUTION INTRAMUSCULAR; INTRAVENOUS AS NEEDED
Status: DISCONTINUED | OUTPATIENT
Start: 2018-10-04 | End: 2018-10-04 | Stop reason: HOSPADM

## 2018-10-04 RX ORDER — FAMOTIDINE 20 MG/1
20 TABLET, FILM COATED ORAL ONCE
Status: COMPLETED | OUTPATIENT
Start: 2018-10-04 | End: 2018-10-04

## 2018-10-04 RX ORDER — SODIUM CHLORIDE, SODIUM LACTATE, POTASSIUM CHLORIDE, CALCIUM CHLORIDE 600; 310; 30; 20 MG/100ML; MG/100ML; MG/100ML; MG/100ML
75 INJECTION, SOLUTION INTRAVENOUS CONTINUOUS
Status: DISCONTINUED | OUTPATIENT
Start: 2018-10-05 | End: 2018-10-04 | Stop reason: HOSPADM

## 2018-10-04 RX ORDER — SODIUM CHLORIDE, SODIUM LACTATE, POTASSIUM CHLORIDE, CALCIUM CHLORIDE 600; 310; 30; 20 MG/100ML; MG/100ML; MG/100ML; MG/100ML
25 INJECTION, SOLUTION INTRAVENOUS CONTINUOUS
Status: DISCONTINUED | OUTPATIENT
Start: 2018-10-04 | End: 2018-10-04 | Stop reason: HOSPADM

## 2018-10-04 RX ORDER — VECURONIUM BROMIDE FOR INJECTION 1 MG/ML
INJECTION, POWDER, LYOPHILIZED, FOR SOLUTION INTRAVENOUS AS NEEDED
Status: DISCONTINUED | OUTPATIENT
Start: 2018-10-04 | End: 2018-10-04 | Stop reason: HOSPADM

## 2018-10-04 RX ORDER — MAGNESIUM SULFATE 100 %
4 CRYSTALS MISCELLANEOUS AS NEEDED
Status: DISCONTINUED | OUTPATIENT
Start: 2018-10-04 | End: 2018-10-04 | Stop reason: HOSPADM

## 2018-10-04 RX ADMIN — GLYCOPYRROLATE 0.4 MG: 0.2 INJECTION INTRAMUSCULAR; INTRAVENOUS at 10:59

## 2018-10-04 RX ADMIN — PROPOFOL 200 MG: 10 INJECTION, EMULSION INTRAVENOUS at 09:53

## 2018-10-04 RX ADMIN — FENTANYL CITRATE 50 MCG: 50 INJECTION, SOLUTION INTRAMUSCULAR; INTRAVENOUS at 11:31

## 2018-10-04 RX ADMIN — SUCCINYLCHOLINE CHLORIDE 100 MG: 20 INJECTION INTRAMUSCULAR; INTRAVENOUS at 09:54

## 2018-10-04 RX ADMIN — Medication 3 MG: at 10:59

## 2018-10-04 RX ADMIN — CLINDAMYCIN PHOSPHATE 900 MG: 900 INJECTION INTRAVENOUS at 09:55

## 2018-10-04 RX ADMIN — FAMOTIDINE 20 MG: 20 TABLET ORAL at 08:28

## 2018-10-04 RX ADMIN — OXYCODONE HYDROCHLORIDE AND ACETAMINOPHEN 1 TABLET: 5; 325 TABLET ORAL at 11:40

## 2018-10-04 RX ADMIN — ONDANSETRON 4 MG: 2 INJECTION INTRAMUSCULAR; INTRAVENOUS at 10:20

## 2018-10-04 RX ADMIN — SODIUM CHLORIDE, SODIUM LACTATE, POTASSIUM CHLORIDE, AND CALCIUM CHLORIDE 75 ML/HR: 600; 310; 30; 20 INJECTION, SOLUTION INTRAVENOUS at 08:28

## 2018-10-04 RX ADMIN — MIDAZOLAM HYDROCHLORIDE 2 MG: 1 INJECTION, SOLUTION INTRAMUSCULAR; INTRAVENOUS at 09:43

## 2018-10-04 RX ADMIN — FENTANYL CITRATE 100 MCG: 50 INJECTION, SOLUTION INTRAMUSCULAR; INTRAVENOUS at 09:53

## 2018-10-04 RX ADMIN — LIDOCAINE HYDROCHLORIDE 60 MG: 20 INJECTION, SOLUTION EPIDURAL; INFILTRATION; INTRACAUDAL; PERINEURAL at 09:53

## 2018-10-04 RX ADMIN — FENTANYL CITRATE 50 MCG: 50 INJECTION, SOLUTION INTRAMUSCULAR; INTRAVENOUS at 11:24

## 2018-10-04 RX ADMIN — VECURONIUM BROMIDE FOR INJECTION 1 MG: 1 INJECTION, POWDER, LYOPHILIZED, FOR SOLUTION INTRAVENOUS at 10:17

## 2018-10-04 RX ADMIN — VECURONIUM BROMIDE FOR INJECTION 3 MG: 1 INJECTION, POWDER, LYOPHILIZED, FOR SOLUTION INTRAVENOUS at 09:56

## 2018-10-04 NOTE — PERIOP NOTES
1108 Pt received to PACU and connected to monitor. Bedside report given by Leon Louis RN. Vital signs stable. Nurse at bedside. Will continue to monitor. Josh Pelletier to update pt's family on current status.

## 2018-10-04 NOTE — IP AVS SNAPSHOT
Audrey Case 
 
 
 [address] 
[phone] Patient: John Parnell MRN: [id number] Z:1/2/1338 About your hospitalization You were admitted on:  October 4, 2018 You last received care in the:  [facility] PHASE 2 RECOVERY You were discharged on:  October 4, 2018 Why you were hospitalized Your primary diagnosis was:  Not on File Follow-up Information Follow up With Details Comments Contact Info Jose Johnson MD   [address] [facility] [address] [facility] 
[phone] Your Scheduled Appointments Monday October 15, 2018  1:00 PM EDT  
POST OP with Aldo Felty, MD  
[facility] [address] [facility] [address] [facility]  
[phone] Discharge Orders None A check beronica indicates which time of day the medication should be taken. My Medications START taking these medications Instructions Each Dose to Equal  
 Morning Noon Evening Bedtime  
 oxyCODONE-acetaminophen 5-325 mg per tablet Commonly known as:  PERCOCET Your last dose was: Your next dose is: Take 1 Tab by mouth every four (4) hours as needed for Pain. Max Daily Amount: 6 Tabs. 1 Tab Where to Get Your Medications Information on where to get these meds will be given to you by the nurse or doctor. ! Ask your nurse or doctor about these medications  
  oxyCODONE-acetaminophen 5-325 mg per tablet Opioid Education Prescription Opioids: What You Need to Know: 
 
Prescription opioids can be used to help relieve moderate-to-severe pain and are often prescribed following a surgery or injury, or for certain health conditions. These medications can be an important part of treatment but also come with serious risks.   Opioids are strong pain medicines. Examples include hydrocodone, oxycodone, fentanyl, and morphine. Heroin is an example of an illegal opioid. It is important to work with your health care provider to make sure you are getting the safest, most effective care. WHAT ARE THE RISKS AND SIDE EFFECTS OF OPIOID USE? Prescription opioids carry serious risks of addiction and overdose, especially with prolonged use. An opioid overdose, often marked by slow breathing, can cause sudden death. The use of prescription opioids can have a number of side effects as well, even when taken as directed. · Tolerance-meaning you might need to take more of a medication for the same pain relief · Physical dependence-meaning you have symptoms of withdrawal when the medication is stopped. Withdrawal symptoms can include nausea, sweating, chills, diarrhea, stomach cramps, and muscle aches. Withdrawal can last up to several weeks, depending on which drug you took and how long you took it. · Increased sensitivity to pain · Constipation · Nausea, vomiting, and dry mouth · Sleepiness and dizziness · Confusion · Depression · Low levels of testosterone that can result in lower sex drive, energy, and strength · Itching and sweating RISKS ARE GREATER WITH:      
· History of drug misuse, substance use disorder, or overdose · Mental health conditions (such as depression or anxiety) · Sleep apnea · Older age (72 years or older) · Pregnancy Avoid alcohol while taking prescription opioids. Also, unless specifically advised by your health care provider, medications to avoid include: · Benzodiazepines (such as Xanax or Valium) · Muscle relaxants (such as Soma or Flexeril) · Hypnotics (such as Ambien or Lunesta) · Other prescription opioids KNOW YOUR OPTIONS Talk to your health care provider about ways to manage your pain that don't involve prescription opioids.   Some of these options may actually work better and have fewer risks and side effects. Consult your physician before adding or stopping any medications, treatments, or physical activity. Options may include: 
· Pain relievers such as acetaminophen, ibuprofen, and naproxen · Some medications that are also used for depression or seizures · Physical therapy and exercise · Counseling to help patients learn how to cope better with triggers of pain and stress. · Application of heat or cold compress · Massage therapy · Relaxation techniques Be Informed Make sure you know the name of your medication, how much and how often to take it, and its potential risks & side effects. IF YOU ARE PRESCRIBED OPIOIDS FOR PAIN: 
· Never take opioids in greater amounts or more often than prescribed. Remember the goal is not to be pain-free but to manage your pain at a tolerable level. · Follow up with your primary care provider to: · Work together to create a plan on how to manage your pain. · Talk about ways to help manage your pain that don't involve prescription opioids. · Talk about any and all concerns and side effects. · Help prevent misuse and abuse. · Never sell or share prescription opioids · Help prevent misuse and abuse. · Store prescription opioids in a secure place and out of reach of others (this may include visitors, children, friends, and family). · Safely dispose of unused/unwanted prescription opioids: Find your community drug take-back program or your pharmacy mail-back program, or flush them down the toilet, following guidance from the Food and Drug Administration (www.fda.gov/Drugs/ResourcesForYou). · Visit www.cdc.gov/drugoverdose to learn about the risks of opioid abuse and overdose. · If you believe you may be struggling with addiction, tell your health care provider and ask for guidance or call [address] [place], [place] [address]Finalta at 4-028-713-EDUJ. Discharge Instructions Instructions Following Ambulatory Surgery Patient: Carlos Rossi MRN: [id number]  SSN: [id number] YOB: 1975  Age: 37 y.o. Sex: female Activity · As tolerated, walking encourage, stairs are okay · Avoid strenuous activities - no lifting anything heavier than 15 pounds. · You may shower at home after 48 hours. Diet · Regular diet after nausea from the anesthetic has passed. Pain · Take pain medication as directed by your doctor ·  Call your doctor if pain is NOT relieved by medication Dressing Care · Remove outer dressing (if any) after 48 hours. Leave steri-strips (if any) in place until they fall off. After Anesthesia · For the first 24 hours: DO NOT Drive, Drink alcoholic beverages, or Make important decisions · Be aware of dizziness following anesthesia and while taking pain medication Call your doctor if 
· Excessive bleeding that does not stop after holding mild pressure over the area · Temperature of 101 degrees F or above · Redness,excessive swelling or bruising, and/or green or yellow, smelly discharge from incision · If nausea and vomiting continues Follow-Up Phone Calls · Call the office at [phone] to make your follow-up appointment Appointment date/time: 2 weeks after the surgery. Dr. José Antonio Reese cell phone number is [phone]. Please call me if you have any concerns or questions. DISCHARGE SUMMARY from Nurse PATIENT INSTRUCTIONS: 
 
After general anesthesia or intravenous sedation, for 24 hours or while taking prescription Narcotics: · Limit your activities · Do not drive and operate hazardous machinery · Do not make important personal or business decisions · Do  not drink alcoholic beverages · If you have not urinated within 8 hours after discharge, please contact your surgeon on call. Report the following to your surgeon: · Excessive pain, swelling, redness or odor of or around the surgical area · Temperature over 100.5 · Nausea and vomiting lasting longer than 4 hours or if unable to take medications · Any signs of decreased circulation or nerve impairment to extremity: change in color, persistent  numbness, tingling, coldness or increase pain · Any questions What to do at Home: 
Recommended activity: Activity as tolerated and no driving for today, *These are general instructions for a healthy lifestyle: No smoking/ No tobacco products/ Avoid exposure to second hand smoke Surgeon General's Warning:  Quitting smoking now greatly reduces serious risk to your health. Obesity, smoking, and sedentary lifestyle greatly increases your risk for illness A healthy diet, regular physical exercise & weight monitoring are important for maintaining a healthy lifestyle You may be retaining fluid if you have a history of heart failure or if you experience any of the following symptoms:  Weight gain of 3 pounds or more overnight or 5 pounds in a week, increased swelling in our hands or feet or shortness of breath while lying flat in bed. Please call your doctor as soon as you notice any of these symptoms; do not wait until your next office visit. Recognize signs and symptoms of STROKE: 
 
F-face looks uneven A-arms unable to move or move unevenly S-speech slurred or non-existent T-time-call 911 as soon as signs and symptoms begin-DO NOT go Back to bed or wait to see if you get better-TIME IS BRAIN. Warning Signs of HEART ATTACK Call 911 if you have these symptoms: 
? Chest discomfort. Most heart attacks involve discomfort in the center of the chest that lasts more than a few minutes, or that goes away and comes back. It can feel like uncomfortable pressure, squeezing, fullness, or pain. ? Discomfort in other areas of the upper body.  Symptoms can include pain or discomfort in one or both arms, the back, neck, jaw, or stomach. ? Shortness of breath with or without chest discomfort. ? Other signs may include breaking out in a cold sweat, nausea, or lightheadedness. Don't wait more than five minutes to call [address]! Fast action can save your life. Calling 911 is almost always the fastest way to get lifesaving treatment. Emergency Medical Services staff can begin treatment when they arrive  up to an hour sooner than if someone gets to the hospital by car. The discharge information has been reviewed with the patient. The patient verbalized understanding. Discharge medications reviewed with the patient and appropriate educational materials and side effects teaching were provided. ___________________________________________________________________________________________________________________________________ Patient armband removed and given to patient to take home. Patient was informed of the privacy risks if armband lost or stolen Introducing [facility]! [employer] introduces Money On Mobile patient portal. Now you can access parts of your medical record, email your doctor's office, and request medication refills online. 1. In your internet browser, go to [email or web address] 2. Click on the First Time User? Click Here link in the Sign In box. You will see the New Member Sign Up page. 3. Enter your Money On Mobile Access Code exactly as it appears below. You will not need to use this code after youve completed the sign-up process. If you do not sign up before the expiration date, you must request a new code. · Money On Mobile Access Code: [email or web address] Expires: 11/7/2018  2:58 PM 
 
4. Enter the last four digits of your Social Security Number (xxxx) and Date of Birth (mm/dd/yyyy) as indicated and click Submit. You will be taken to the next sign-up page. 5. Create a MaidSafe ID. This will be your MaidSafe login ID and cannot be changed, so think of one that is secure and easy to remember. 6. Create a MaidSafe password. You can change your password at any time. 7. Enter your Password Reset Question and Answer. This can be used at a later time if you forget your password. 8. Enter your e-mail address. You will receive e-mail notification when new information is available in [facility] 9. Click Sign Up. You can now view and download portions of your medical record. 10. Click the Download Summary menu link to download a portable copy of your medical information. If you have questions, please visit the Frequently Asked Questions section of the MaidSafe website. Remember, MaidSafe is NOT to be used for urgent needs. For medical emergencies, dial 911. Now available from your iPhone and Android! Introducing Santana Arias As a [facility] patient, I wanted to make you aware of our electronic visit tool called Santana Tatosavanacornelio. [facility] 24/7 allows you to connect within minutes with a medical provider 24 hours a day, seven days a week via a mobile device or tablet or logging into a secure website from your computer. You can access Santana Arias from anywhere in the [place]. A virtual visit might be right for you when you have a simple condition and feel like you just dont want to get out of bed, or cant get away from work for an appointment, when your regular [facility] provider is not available (evenings, weekends or holidays), or when youre out of town and need minor care. Electronic visits cost only $49 and if the [facility] 24/7 provider determines a prescription is needed to treat your condition, one can be electronically transmitted to a nearby pharmacy*. Please take a moment to enroll today if you have not already done so. The enrollment process is free and takes just a few minutes.   To enroll, please download the [email or web address] 24/7 haily to your tablet or phone, or visit [email or web address] to enroll on your computer. And, as an [address] [place] [address] patient with a ICAgen account, the results of your visits will be scanned into your electronic medical record and your primary care provider will be able to view the scanned results. We urge you to continue to see your regular [facility] provider for your ongoing medical care. And while your primary care provider may not be the one available when you seek a Instant API virtual visit, the peace of mind you get from getting a real diagnosis real time can be priceless. For more information on Instant API, view our Frequently Asked Questions (FAQs) at [email or web address]. Sincerely, 
 
Lexie Hurd MD 
Chief Medical Officer Charlene Solomon *:  certain medications cannot be prescribed via Instant API Providers Seen During Your Hospitalization Provider Specialty Primary office phone Darreld Gottron, MD Surgery [phone] Your Primary Care Physician (PCP) Primary Care Physician Office Phone Office Fax Annie Seek [phone] You are allergic to the following Allergen Reactions Pcn (Penicillins) Hives Recent Documentation Height Weight BMI OB Status Smoking Status 1.702 m 118.5 kg 40.92 kg/m2 Having regular periods Never Smoker Emergency Contacts Name Discharge Info Relation Home Work Mobile Joo Ramon DISCHARGE CAREGIVER [3] Spouse [3]   [phone] Patient Belongings The following personal items are in your possession at time of discharge: 
  Dental Appliances: None  Visual Aid: Glasses      Home Medications: None   Jewelry: None  Clothing: Footwear, Undergarments, Pants, Shirt, Jacket/Coat    Other Valuables: None Please provide this summary of care documentation to your next provider. Signatures-by signing, you are acknowledging that this After Visit Summary has been reviewed with you and you have received a copy. Patient Signature:  ____________________________________________________________ Date:  ____________________________________________________________  
  
[facility] Provider Signature:  ____________________________________________________________ Date:  ____________________________________________________________

## 2018-10-04 NOTE — BRIEF OP NOTE
BRIEF OPERATIVE NOTE    Date of Procedure: 10/4/2018   Preoperative Diagnosis: incisional hernia  k43.2  Postoperative Diagnosis: incisional hernia  k43.2    Procedure(s):  davinci recurrent  INCISIONAL hernia  REPAIR with mesh ROBOTIC ASSISTED  Surgeon(s) and Role:     * Juan Dennison MD - Primary  Surgical Staff:  Circ-1: David Sapp RN  Circ-2: Beatrice Fowler RN  Scrub Tech-1: Linda Osgood  Surg Asst-1: [facility] Cross  Event Time In   Incision Start 1000   Incision Close      Anesthesia: General   Estimated Blood Loss: Minimal  Specimens: * No specimens in log *   Findings: Recurrent incisional hernia just above the umbilicus. Complications: None. Implants:   Implant Name Type Inv.  Item Serial No.  Lot No. LRB No. Used Action   MESH [facility] S/T - [id number]   MESH MARGO ELLIPTICAL 4X6IN -- VENTRALIGHT S/T   BARD GARCÍA [id number] N/A 1 Implanted

## 2018-10-04 NOTE — ANESTHESIA PREPROCEDURE EVALUATION
Anesthetic History     PONV          Review of Systems / Medical History  Patient summary reviewed and pertinent labs reviewed    Pulmonary  Within defined limits                 Neuro/Psych   Within defined limits           Cardiovascular  Within defined limits                Exercise tolerance: >4 METS     GI/Hepatic/Renal  Within defined limits              Endo/Other        Morbid obesity and cancer     Other Findings   Comments: Documentation of current medication  Current medications obtained, documented and obtained? YES      Risk Factors for Postoperative nausea/vomiting:       History of postoperative nausea/vomiting? NO       Female? YES       Motion sickness? NO       Intended opioid administration for postoperative analgesia? YES      Smoking Abstinence:  Current Smoker? NO  Elective Surgery? YES  Seen preoperatively by anesthesiologist or proxy prior to day of surgery? YES  Pt abstained from smoking 24 hours prior to anesthesia?  N/A    Preventive care/screening for High Blood Pressure:  Aged 18 years and older: YES  Screened for high blood pressure: YES  Patients with high blood pressure referred to primary care provider   for BP management: YES                 Physical Exam    Airway  Mallampati: III    Neck ROM: normal range of motion   Mouth opening: Diminished (comment)     Cardiovascular    Rhythm: regular  Rate: normal         Dental  No notable dental hx       Pulmonary  Breath sounds clear to auscultation               Abdominal  GI exam deferred       Other Findings            Anesthetic Plan    ASA: 3  Anesthesia type: general          Induction: Intravenous  Anesthetic plan and risks discussed with: Patient

## 2018-10-04 NOTE — PROGRESS NOTES
Date of Surgery Update:  John Parnell was seen and examined. History and physical has been reviewed. The patient has been examined. There have been no significant clinical changes since the completion of the originally dated History and Physical. Will proceed with robotic repair of incisional hernia with mesh.      Signed By: Aldo Felty, MD     October 4, 2018 9:13 AM

## 2018-10-04 NOTE — DISCHARGE INSTRUCTIONS
Instructions Following Ambulatory Surgery    Patient: Beau Savage MRN: [id number]  SSN: [id number]    YOB: 1975  Age: 37 y.o. Sex: female      Activity  · As tolerated, walking encourage, stairs are okay  · Avoid strenuous activities - no lifting anything heavier than 15 pounds. · You may shower at home after 48 hours. Diet  · Regular diet after nausea from the anesthetic has passed. Pain  · Take pain medication as directed by your doctor  ·  Call your doctor if pain is NOT relieved by medication    Dressing Care  · Remove outer dressing (if any) after 48 hours. Leave steri-strips (if any) in place until they fall off. After Anesthesia  · For the first 24 hours: DO NOT Drive, Drink alcoholic beverages, or Make important decisions  · Be aware of dizziness following anesthesia and while taking pain medication    Call your doctor if  · Excessive bleeding that does not stop after holding mild pressure over the area  · Temperature of 101 degrees F or above  · Redness,excessive swelling or bruising, and/or green or yellow, smelly discharge from incision  · If nausea and vomiting continues    Follow-Up Phone Calls  · Call the office at [phone] to make your follow-up appointment    Appointment date/time: 2 weeks after the surgery. Dr. Tia Yu cell phone number is [phone]. Please call me if you have any concerns or questions. DISCHARGE SUMMARY from Nurse    PATIENT INSTRUCTIONS:    After general anesthesia or intravenous sedation, for 24 hours or while taking prescription Narcotics:  · Limit your activities  · Do not drive and operate hazardous machinery  · Do not make important personal or business decisions  · Do  not drink alcoholic beverages  · If you have not urinated within 8 hours after discharge, please contact your surgeon on call.     Report the following to your surgeon:  · Excessive pain, swelling, redness or odor of or around the surgical area  · Temperature over 100.5  · Nausea and vomiting lasting longer than 4 hours or if unable to take medications  · Any signs of decreased circulation or nerve impairment to extremity: change in color, persistent  numbness, tingling, coldness or increase pain  · Any questions    What to do at Home:  Recommended activity: Activity as tolerated and no driving for today, *These are general instructions for a healthy lifestyle:    No smoking/ No tobacco products/ Avoid exposure to second hand smoke  Surgeon General's Warning:  Quitting smoking now greatly reduces serious risk to your health. Obesity, smoking, and sedentary lifestyle greatly increases your risk for illness    A healthy diet, regular physical exercise & weight monitoring are important for maintaining a healthy lifestyle    You may be retaining fluid if you have a history of heart failure or if you experience any of the following symptoms:  Weight gain of 3 pounds or more overnight or 5 pounds in a week, increased swelling in our hands or feet or shortness of breath while lying flat in bed. Please call your doctor as soon as you notice any of these symptoms; do not wait until your next office visit. Recognize signs and symptoms of STROKE:    F-face looks uneven    A-arms unable to move or move unevenly    S-speech slurred or non-existent    T-time-call 911 as soon as signs and symptoms begin-DO NOT go       Back to bed or wait to see if you get better-TIME IS BRAIN. Warning Signs of HEART ATTACK     Call 911 if you have these symptoms:   Chest discomfort. Most heart attacks involve discomfort in the center of the chest that lasts more than a few minutes, or that goes away and comes back. It can feel like uncomfortable pressure, squeezing, fullness, or pain.  Discomfort in other areas of the upper body. Symptoms can include pain or discomfort in one or both arms, the back, neck, jaw, or stomach.  Shortness of breath with or without chest discomfort.    Other signs may include breaking out in a cold sweat, nausea, or lightheadedness. Don't wait more than five minutes to call 911 - MINUTES MATTER! Fast action can save your life. Calling 911 is almost always the fastest way to get lifesaving treatment. Emergency Medical Services staff can begin treatment when they arrive -- up to an hour sooner than if someone gets to the hospital by car. The discharge information has been reviewed with the patient. The patient verbalized understanding. Discharge medications reviewed with the patient and appropriate educational materials and side effects teaching were provided. ___________________________________________________________________________________________________________________________________  Patient armband removed and given to patient to take home.   Patient was informed of the privacy risks if armband lost or stolen

## 2018-10-04 NOTE — OP NOTES
[facility]  OPERATIVE REPORT    Vlad Chua  MR#: [id number]  : 1975  ACCOUNT #: [de-identified]   DATE OF SERVICE: 10/04/2018    SURGEON:  Tyrese Xiong MD    ASSISTANT:  Arminda Bautista    PREOPERATIVE DIAGNOSIS:  Recurrent incisional hernia at the umbilicus. POSTOPERATIVE DIAGNOSIS:  Recurrent incisional hernia at the umbilicus. PROCEDURE PERFORMED:  Robotic repair of recurrent incisional hernia with placement of mesh. ANESTHESIA:  General.    COMPLICATIONS:  None. SPECIMENS REMOVED:  None. ESTIMATED BLOOD LOSS:  Minimal.    IMPLANTS:  Mesh. DETAILS OF PROCEDURE:  The patient was brought to operating room. Anesthesia was induced. Scrubbing and draping of the abdomen was done in the usual manner. A timeout was performed. A skin incision in the left upper quadrant was performed. Veress needle was inserted. Abdomen was insufflated. An 8 mm port was inserted. Abdomen was explored. There was some adhesion between the omentum and the anterior abdominal wall at the site of the previous hernia repair. Two other 8 mm ports were placed in the left side of the abdominal wall. The robot was docked. At this point, there was evidence of an old mesh that was well healed. However, there was a hernia above the mesh that seems to be about 2 cm in size with incarcerated preperitoneal fat and part of the omentum. First lysis of adhesion was performed. The omentum was taken down, and at this point, the peritoneum was opened and the hernia contents were all reduced. After reduction of the hernia content, the defect was identified. It was about 2 cm long. It was closed with 0 V-Loc suture 6 inch in 2 layers. At this point, we dissected the falciform ligament all the way to the liver and we divided it.   We kept the part of the falciform ligament attached to anterior abdominal wall to create a flap and at this point, a 15 x 11 Ventralight mesh was inserted inside the abdomen and it was fixed with running 2-0 V-Loc sutures in a running fashion. At this point, after fixing the mesh in place, we placed falciform ligament back on top of the mesh, and this was sutured to the anterior abdominal wall. This covered the mesh completely. At this point, hemostasis was secure. The instruments were removed. The robot was undocked, and the skin incisions were closed with 4-0 Monocryl.       MD Senthil Wright / Faisal Man  D: 10/04/2018 11:05     T: 10/04/2018 11:28  JOB #: [id number]

## 2018-10-04 NOTE — ANESTHESIA POSTPROCEDURE EVALUATION
Post-Anesthesia Evaluation and Assessment    Patient: Judith Menezes MRN: [id number]  SSN: [id number]    YOB: 1975  Age: 37 y.o. Sex: female       Cardiovascular Function/Vital Signs  Visit Vitals    /80 (BP 1 Location: Left arm, BP Patient Position: At rest)    Pulse 79    Temp 37.1 °C (98.7 °F)    Resp 16    Ht 5' 7\" (1.702 m)    Wt 118.5 kg (261 lb 4 oz)    SpO2 95%    BMI 40.92 kg/m2       Patient is status post general anesthesia for Procedure(s):  davinci recurrent  INCISIONAL hernia  REPAIR with mesh ROBOTIC ASSISTED. Nausea/Vomiting: None    Postoperative hydration reviewed and adequate. Pain:  Pain Scale 1: Numeric (0 - 10) (10/04/18 1200)  Pain Intensity 1: 4 (10/04/18 1200)   Managed    Neurological Status:   Neuro (WDL): Within Defined Limits (10/04/18 1110)   At baseline    Mental Status and Level of Consciousness: Arousable    Pulmonary Status:   O2 Device: Room air (10/04/18 1119)   Adequate oxygenation and airway patent    Complications related to anesthesia: None    Post-anesthesia assessment completed.  No concerns    Signed By: Charlie Kaiser MD     October 4, 2018

## 2018-10-15 ENCOUNTER — OFFICE VISIT (OUTPATIENT)
Dept: SURGERY | Age: 43
End: 2018-10-15

## 2018-10-15 VITALS
RESPIRATION RATE: 16 BRPM | BODY MASS INDEX: 40.18 KG/M2 | WEIGHT: 256 LBS | HEIGHT: 67 IN | TEMPERATURE: 98.6 F | HEART RATE: 89 BPM | SYSTOLIC BLOOD PRESSURE: 124 MMHG | DIASTOLIC BLOOD PRESSURE: 70 MMHG

## 2018-10-15 DIAGNOSIS — Z09 POSTOPERATIVE EXAMINATION: Primary | ICD-10-CM

## 2018-10-15 RX ORDER — IBUPROFEN 800 MG/1
800 TABLET ORAL
Qty: 40 TAB | Refills: 0 | Status: SHIPPED | OUTPATIENT
Start: 2018-10-15

## 2018-10-15 RX ORDER — IBUPROFEN 200 MG
CAPSULE ORAL AS NEEDED
COMMUNITY
End: 2018-10-15 | Stop reason: DRUGHIGH

## 2018-10-15 NOTE — PATIENT INSTRUCTIONS
Abdominal Hernia Repair: What to Expect at Home  Your Recovery  After surgery to repair your hernia, you are likely to have pain for a few days. You may also feel like you have the flu, and you may have a low fever and feel tired and nauseated. This is common. You should feel better after a few days and will probably feel much better in 7 days. For several weeks you may feel twinges or pulling in the hernia repair when you move. You may have some bruising around the area of your hernia repair. This is normal.  This care sheet gives you a general idea about how long it will take for you to recover. But each person recovers at a different pace. Follow the steps below to get better as quickly as possible. How can you care for yourself at home? Activity    · Rest when you feel tired. Getting enough sleep will help you recover.     · Try to walk each day. Start by walking a little more than you did the day before. Bit by bit, increase the amount you walk. Walking boosts blood flow and helps prevent pneumonia and constipation.     · If your doctor gives you an abdominal binder to wear, use it as directed. This is an elastic bandage that wraps around your belly and upper hips. It helps support your belly muscles after surgery.     · Avoid strenuous activities, such as biking, jogging, weight lifting, or aerobic exercise, until your doctor says it is okay.     · Avoid lifting anything that would make you strain. This may include heavy grocery bags and milk containers, a heavy briefcase or backpack, cat litter or dog food bags, a vacuum , or a child.     · Ask your doctor when you can drive again.     · Most people are able to return to work within 1 to 2 weeks after surgery. But if your job requires that you to do heavy lifting or strenuous activity, you may need to take 4 to 6 weeks off from work.     · You may shower 24 to 48 hours after surgery, if your doctor okays it. Pat the cut (incision) dry.  Do not take a bath for the first 2 weeks, or until your doctor tells you it is okay.     · Ask your doctor when it is okay for you to have sex. Diet    · You can eat your normal diet. If your stomach is upset, try bland, low-fat foods like plain rice, broiled chicken, toast, and yogurt.     · Drink plenty of fluids (unless your doctor tells you not to).     · You may notice that your bowel movements are not regular right after your surgery. This is common. Avoid constipation and straining with bowel movements. You may want to take a fiber supplement every day. If you have not had a bowel movement after a couple of days, ask your doctor about taking a mild laxative. Medicines    · Your doctor will tell you if and when you can restart your medicines. He or she will also give you instructions about taking any new medicines.     · If you take blood thinners, such as warfarin (Coumadin), clopidogrel (Plavix), or aspirin, be sure to talk to your doctor. He or she will tell you if and when to start taking those medicines again. Make sure that you understand exactly what your doctor wants you to do.     · Be safe with medicines. Take pain medicines exactly as directed. ¨ If the doctor gave you a prescription medicine for pain, take it as prescribed. ¨ If you are not taking a prescription pain medicine, ask your doctor if you can take an over-the-counter medicine.     · If your doctor prescribed antibiotics, take them as directed. Do not stop taking them just because you feel better. You need to take the full course of antibiotics.     · If you think your pain medicine is making you sick to your stomach:  ¨ Take your medicine after meals (unless your doctor has told you not to). ¨ Ask your doctor for a different pain medicine. Incision care    · If you have strips of tape on the cut (incision) the doctor made, leave the tape on for a week or until it falls off. Or follow your doctor's instructions for removing the tape.   · If you have staples closing the cut, you will need to visit your doctor in 1 to 2 weeks to have them removed.     · Wash the area daily with warm, soapy water, and pat it dry. Don't use hydrogen peroxide or alcohol, which can slow healing. You may cover the area with a gauze bandage if it weeps or rubs against clothing. Change the bandage every day. Other instructions    · Hold a pillow over your incision when you cough or take deep breaths. This will support your belly and decrease your pain.     · Do breathing exercises at home as instructed by your doctor. This will help prevent pneumonia.     · If you had laparoscopic surgery, you may also have pain in your left shoulder. The pain usually lasts about a day or two. Follow-up care is a key part of your treatment and safety. Be sure to make and go to all appointments, and call your doctor if you are having problems. It's also a good idea to know your test results and keep a list of the medicines you take. When should you call for help? Call 911 anytime you think you may need emergency care. For example, call if:    · You passed out (lost consciousness).     · You are short of breath.    Call your doctor now or seek immediate medical care if:    · You are sick to your stomach and cannot drink fluids.     · You have signs of a blood clot in your leg (called a deep vein thrombosis), such as:  ¨ Pain in your calf, back of the knee, thigh, or groin. ¨ Redness and swelling in your leg or groin.     · You have signs of infection, such as:  ¨ Increased pain, swelling, warmth, or redness. ¨ Red streaks leading from the incision. ¨ Pus draining from the incision.   ¨ A fever.     · You cannot pass stools or gas.     · You have pain that does not get better after you take pain medicine.     · You have loose stitches, or your incision comes open.     · Bright red blood has soaked through the bandage over your incision.    Watch closely for changes in your health, and be sure to contact your doctor if you have any problems. Where can you learn more? Go to http://jey-kathie.info/. Enter B577 in the search box to learn more about \"Abdominal Hernia Repair: What to Expect at Home. \"  Current as of: March 28, 2018  Content Version: 11.8  © 0069-2437 [employer]. Care instructions adapted under license by [employer] (which disclaims liability or warranty for this information). If you have questions about a medical condition or this instruction, always ask your healthcare professional. [address] any warranty or liability for your use of this information.

## 2018-12-03 ENCOUNTER — TELEPHONE (OUTPATIENT)
Dept: SURGERY | Age: 43
End: 2018-12-03

## 2018-12-03 NOTE — TELEPHONE ENCOUNTER
Spoke with Ms. Tigre Terrazas to inform she need to sign a medical record request form for [employer] to process her request to [address] [employer] for her short term disability. Additionally I informed Ms. Ramon I emailed her the document to complete on Friday, November 30th. Ms. Tigre Terrazas states she will complete the form and take it to the [facility] office.

## 2018-12-06 ENCOUNTER — TELEPHONE (OUTPATIENT)
Dept: SURGERY | Age: 43
End: 2018-12-06

## 2018-12-06 NOTE — TELEPHONE ENCOUNTER
Ms. Kate Traylor called to inquire about the status of medical records for STD benefits to [place] [id number]. Informed the request/signed authorization was faxed on December 3rd.  I called [employer] or medical record  and left a voice mail message to get a status of the request.

## 2018-12-21 ENCOUNTER — TELEPHONE (OUTPATIENT)
Dept: SURGERY | Age: 43
End: 2018-12-21

## 2018-12-21 NOTE — TELEPHONE ENCOUNTER
Ms. Antonette Metcalf called to inquire about the status of her medical records stating she's not getting paid. Ms. Antonette Metcalf stated she updated the request with the address for [facility] [employer]. I called [facility] and was informed they receipt the corrected request with liberty address and they should be able to process her request next week.

## 2019-01-02 ENCOUNTER — TELEPHONE (OUTPATIENT)
Dept: SURGERY | Age: 44
End: 2019-01-02

## 2019-01-02 NOTE — TELEPHONE ENCOUNTER
Ms. Bonita Ramachandran called to inquire if it normally take two months for our vendor to process medical record request.  Ms. Bonita Ramachandran stated she spoke with [employer] today and was informed they're processing her request however they're not able to give her a date her request will be complete. Ms Bonita Ramachandran states she's been trying to get her records now for two months and she's currently in an appeal request for her short term disability and if they don't receive the records by January 14, 2019 she won't get paid for the last two months. I apologized to Ms. Ramon for the delay and informed her I would contact a supervisor at [employer] to request their assistance with her request.

## 2019-01-02 NOTE — TELEPHONE ENCOUNTER
Left voice mail message for [employer] supervisor Ms. Lizzie Cervantes regarding a status of Ms.  Tristen medical record request.

## 2019-01-03 ENCOUNTER — TELEPHONE (OUTPATIENT)
Dept: SURGERY | Age: 44
End: 2019-01-03

## 2019-01-03 NOTE — TELEPHONE ENCOUNTER
Spoke with Ms. Leroy Robbins to inform I received an email response from [employer] and informed Ms. Ramon I'll fax the copy of the letter she received from the nurse practitioner at our [facility] office extending her medical leave of absence to [facility] and I explained to Ms. Ramon the request states they're requesting dates of service 11/1/2018 to present but she was not seen after November 1, 2018. Ms. Leroy Robbins states she will contact [employer].    From: Mark Fernandez   Sent: Wednesday, January 02, 2019 5:01 PM  To: Trevin Landry  Subject: RE: JAQUI Inquiry Urgent Request    [facility],     We actually received a call about this request today, the request was processed but we sent a return letter. The return letter was sent due to the requester is only asking for records from 11/01/2018 to present from Dr. Og Sweeney, there are no treatment dates for time frame being requested. We also spoke with the patient today. Thanks     Lowell Bello   Site   [facility]   [address] [facility] [address] [place] [address]. [email or web address]  [email or web address]   [phone]  [phone]

## 2019-01-11 ENCOUNTER — TELEPHONE (OUTPATIENT)
Dept: SURGERY | Age: 44
End: 2019-01-11

## 2019-01-11 NOTE — TELEPHONE ENCOUNTER
Spoke with [facility] at [facility] on behalf of Ms. Ramon because I received an email from Ms. Ramon stating [employer] has informed her they didn't receive the operative report. [facility] stated she will release the operative report because the patient noted entire record on her request form but normally they only release medical records for the physician office and not the hospital side.

## 2021-11-03 NOTE — PERIOP NOTES
Patient Seen in: [facility] 1se-b      History   Patient presents with:  Cough/URI  Difficulty Breathing    Stated Complaint: mari/wheezing, fever, cough    Subjective:   HPI    Patient with a history of asthma now with worsening episodes going out f Phase 2 Recovery Summary  Patient arrived to Phase 2 at 1200. Report received from [facility], RN V/S are stable. Patient states pain is a 4/10. NHUNG Fragoso just gave a percocet and patient states that she is starting to feel better. Vitals:    10/04/18 1133 10/04/18 1139 10/04/18 1144 10/04/18 1200   BP: 133/81 146/83 123/62 140/80   Pulse: 85 89 88 79   Resp: 17 21 16 16   Temp:       SpO2: 96% 98% 97% 95%   Weight:       Height:           oriented to time, place, person and situation    Lines and Drains  Peripheral Intravenous Line:   Peripheral IV 10/04/18 Right Arm (Active)   Site Assessment Clean, dry, & intact 10/4/2018 12:00 PM   Phlebitis Assessment 0 10/4/2018 12:00 PM   Infiltration Assessment 0 10/4/2018 12:00 PM   Dressing Status Clean, dry, & intact 10/4/2018 12:00 PM   Dressing Type Tape;Transparent 10/4/2018 12:00 PM   Hub Color/Line Status Pink; Infusing 10/4/2018 12:00 PM   Action Taken Open ports on tubing capped 10/4/2018 11:10 AM   Alcohol Cap Used Yes 10/4/2018 12:00 PM       Wound  Wound Abdomen Anterior (Active)   DRESSING STATUS Clean, dry, and intact 10/4/2018 12:06 PM   DRESSING TYPE 2 x 2;Topical skin adhesive/glue 10/4/2018 12:06 PM   Incision site well approximated? Yes 10/4/2018 12:06 PM   Number of days:0          Discharge discussed with patient and . There are no questions or concerns at this time. Patient discharged to home with  Agnieszka Lopez) at [phone].   Wendy Berman RN tenderness, no distension, no rebound, no guarding   Extremities: No cyanosis, no clubbing, no edema   Musculoskeletal: No tenderness, swelling, deformity   Skin: No significant lesions   Neuro: Grossly intact to patient's baseline     ED Course     Labs R PCR - Normal   CBC WITH DIFFERENTIAL WITH PLATELET    Narrative: The following orders were created for panel order CBC With Differential With Platelet.   Procedure                               Abnormality         Status                     --------- still increased work of breathing. Rapid Covid is negative. Expanded flu panel and respiratory panel is pending. Case discussed with Dr. Moy Daugherty pediatric hospitalist.  Patient will be admitted to PICU.         Admission disposition: 11/2/2021  1:48 AM

## 2022-03-19 PROBLEM — C06.9 ORAL CANCER (HCC): Status: ACTIVE | Noted: 2018-08-09

## 2022-03-19 PROBLEM — E66.01 OBESITY, MORBID (HCC): Status: ACTIVE | Noted: 2018-08-09

## 2022-06-03 NOTE — PERIOP NOTES
PAT - SURGICAL PRE-ADMISSION INSTRUCTIONS    NAME:  Adriana Mcgovern                                                          TODAY'S DATE:  10/2/2018    SURGERY DATE:  10/4/2018                                  SURGERY ARRIVAL TIME:   0900    1. Do NOT eat or drink anything, including candy or gum, after MIDNIGHT on 10/3/18 , unless you have specific instructions from your Surgeon or Anesthesia Provider to do so. 2. No smoking on the day of surgery. 3. No alcohol 24 hours prior to the day of surgery. 4. No recreational drugs for one week prior to the day of surgery. 5. Leave all valuables, including money/purse, at home. 6. Remove all jewelry, nail polish, makeup (including mascara); no lotions, powders, deodorant, or perfume/cologne/after shave. 7. Glasses/Contact lenses and Dentures may be worn to the hospital.  They will be removed prior to surgery. 8. Call your doctor if symptoms of a cold or illness develop within 24 ours prior to surgery. 9. AN ADULT MUST DRIVE YOU HOME AFTER OUTPATIENT SURGERY. 10. If you are having an OUTPATIENT procedure, please make arrangements for a responsible adult to be with you for 24 hours after your surgery. 11. If you are admitted to the hospital, you will be assigned to a bed after surgery is complete. Normally a family member will not be able to see you until you are in your assigned bed. 15. Family is encouraged to accompany you to the hospital.  We ask visitors in the treatment area to be limited to ONE person at a time to ensure patient privacy. EXCEPTIONS WILL BE MADE AS NEEDED. 15. Children under 12 are discouraged from entering the treatment area and need to be supervised by an adult when in the waiting room. Special Instructions:    NONE. Patient Prep:    shower with anti-bacterial soap    These surgical instructions were reviewed with PATIENT during the PAT PHONE CALL. Directions:   On the morning of surgery, please go to the [facility]. Enter the building from the [facility] entrance, 1st floor (next to the Emergency Room entrance). Take the elevator to the 2nd floor. Sign in at the Registration Desk.     If you have any questions and/or concerns, please do not hesitate to call:  (Prior to the day of surgery)  [facility] unit:  [phone]  (Day of surgery)  [facility] unit:  [phone]

## 2022-06-29 ENCOUNTER — TRANSCRIBE ORDER (OUTPATIENT)
Dept: SCHEDULING | Age: 47
End: 2022-06-29

## 2022-06-29 DIAGNOSIS — Z12.31 ENCOUNTER FOR SCREENING MAMMOGRAM FOR MALIGNANT NEOPLASM OF BREAST: Primary | ICD-10-CM

## 2022-09-28 ENCOUNTER — OFFICE VISIT (OUTPATIENT)
Dept: SURGERY | Age: 47
End: 2022-09-28
Payer: COMMERCIAL

## 2022-09-28 ENCOUNTER — HOSPITAL ENCOUNTER (OUTPATIENT)
Dept: LAB | Age: 47
Discharge: HOME | End: 2022-09-28
Payer: COMMERCIAL

## 2022-09-28 VITALS
HEART RATE: 85 BPM | HEIGHT: 67 IN | SYSTOLIC BLOOD PRESSURE: 128 MMHG | OXYGEN SATURATION: 100 % | BODY MASS INDEX: 43.47 KG/M2 | DIASTOLIC BLOOD PRESSURE: 78 MMHG | RESPIRATION RATE: 16 BRPM | WEIGHT: 277 LBS | TEMPERATURE: 98.5 F

## 2022-09-28 DIAGNOSIS — K21.9 GASTROESOPHAGEAL REFLUX DISEASE WITHOUT ESOPHAGITIS: ICD-10-CM

## 2022-09-28 DIAGNOSIS — G47.33 OBSTRUCTIVE SLEEP APNEA: ICD-10-CM

## 2022-09-28 DIAGNOSIS — K43.9 VENTRAL HERNIA WITHOUT OBSTRUCTION OR GANGRENE: ICD-10-CM

## 2022-09-28 PROCEDURE — 83013 H PYLORI (C-13) BREATH: CPT

## 2022-09-28 PROCEDURE — 99204 OFFICE O/P NEW MOD 45 MIN: CPT | Performed by: STUDENT IN AN ORGANIZED HEALTH CARE EDUCATION/TRAINING PROGRAM

## 2022-09-28 NOTE — PROGRESS NOTES
Bariatric Surgery Initial Consult    Patient: Travis Madison MRN: [id number]  SSN: [id number]    YOB: 1975  Age: 52 y.o. Sex: female      Subjective:      Travis Madison is a 52 y.o. female who is being seen for new bariatric consultation    Current Weight: 277  Body mass index is 43.38 kg/m². Ideal body weight: 61.6 kg (135 lb 12.9 oz)  Adjusted ideal body weight: 87.2 kg (192 lb 4.5 oz)  Excess Body Weight: [address] presents for initial consultation for weight loss surgery. She has struggled with her weight for a number of years and has failed meaningful and sustained attempts at weight loss. Her main issues right now is that she feels increasingly tired and fatigued during the day, and thinks that her weight is preventing her from doing many of the things that she would like to do. She has tried weight watchers and various appetite suppressants in the past with limited success. She has exercise less recently though previously she would use the treadmill and stairmaster for exercise. She has developed new onset reflux with some regurgitation over the past 6 months or so. Her main triggers are fried foods and spaghetti sauce and it is especially exacerbated if she lies down after surgery. It has been alleviated somewhat by avoiding these foods.     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 Benny Nichols then full excision by Dr. 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 Indications: Pain 40 Tab 0     Social History     Tobacco Use    Smoking status: Never    Smokeless tobacco: Never   Substance Use Topics    Alcohol use: Yes     Comment: rarely     No family history on file. Last Pap Smear: July 2022  Mammogram: July 2022  Colonoscopy:N/A  EGD/UGI: N/A  STOP-BANG score: 5  GERD-HRQL score: 6    Review of Systems:    General: Denies fevers, chills, night sweats, fatigue, weight loss, or weight gain. HEENT: Denies changes in auditory or visual acuity, recurrent pharyngitis, epistaxis, chronic rhinorrhea, vertigo    Respiratory: Denies increasing shortness of breath, productive cough, hemoptysis    Cardiac: Denies known history of cardiac disease, heart murmur, palpitations    GI: Denies dysphagia, recurrent emesis, hematemesis, changes in bowel habits, hematochezia, melena    : Denies hematuria frequency urgency dysuria    Musculoskeletal: Denies fractures, dislocations    Neurologic: Denies history of CVA, paralysis paresthesias, recurrent cephalgia, seizures    Endocrine: Denies polyuria, polydipsia, polyphagia, heat and cold intolerance    Lymph/heme: Denies a history of malignancy, anemia, bruising, blood transfusions    Integumentary: Negative for dermatitis     Psych: Denies a history of depression or anxiety    Objective:     Vitals:    09/28/22 0900   BP: 128/78   Pulse: 85   Resp: 16   Temp: 98.5 °F (36.9 °C)   TempSrc: Temporal   SpO2: 100%   Weight: 125.6 kg (277 lb)   Height: 5' 7\" (1.702 m)        General: no acute distress, nontoxic in appearance.   Head: Normocephalic, atraumatic  Mouth: Clear, no overt lesions, oral mucosa is pink and moist.  Neck: Supple, no masses, no adenopathy or carotid bruits, trachea midline  Resp: Clear to auscultation bilaterally, no wheezing, rhonchi, or rales, excursions normal and symmetrical.  Cardio: Regular rate and rhythm, no murmurs, clicks, gallops, or rubs. Abdomen: soft, nontender, nondistended, normoactive bowel sounds, no hernias. Extremities: Warm, well perfused, no tenderness or swelling, normal gait/station, without edema or varicosities  Neuro: Sensation and strength grossly intact and symmetrical.  Psych: Alert and oriented to person, place, and time. Labs:     N/A    Assessment: This patient is a 52 y.o. obese female with a current Body mass index is 43.38 kg/m². who is considering bariatric surgery, and would be an appropriate candidate for enrollment in the preoperative pathway. The patient is considering Laparoscopic Sleeve Gastrectomy for surgical weight loss due to their ineffective progress with medical forms of weight loss and the urging of their physicians who care for their primary medical issues. The patient  now presents  for consideration for weight loss surgery understanding the benefits of this over a medical approach of weight loss as was discussed in our seminar on weight loss surgery. They have discussed their plans both with their family and primary care physician who is in support of their pursuit of such. The patient's goal weight is 167lb. These goals are consistent with expected outcomes of their desired operation. Her Medical goals are resolution of these health issues. The possible short and long term  complications of the sleeve gastrectomy were also discussed, to include but not limited to;death, DVT/PE, staple line leak, bleeding, stricture formation, surgical site infection, and reflux. I spent a significant amount of time emphasizing the reflux risk from this procedure which ranges from 25-40% postoperatively.   Specific weight related outcomes for success were also discussed with an emphasis on careful and close follow-up with the first year and dietary behavior modification over the first years as baseline cyclical hunger returns  The patient expressed an understanding of the above factors, and her questions were answered in their entirety. Plan: Will plan to enroll in the preoperative bariatric pathway  Will schedule consultation with our dietitian  Will schedule for preoperative psych clearance  Standard preoperative bariatric lab panel ordered  STOP-BANG score elevated at 5, and daytime fatigue is one of her main complaints. Will order sleep study and treat any FRANKY as indicated  Upper GI ordered  Had a long discussion with the patient about colon cancer screening. She has no family history, but does meet screening criteria at her age. Strongly recommended colon cancer screening which she is amenable to, and that referral has been placed.   Return to clinic for midpoint evaluation    Signed By: Yanna Joaquin MD     September 28, 2022

## 2022-09-28 NOTE — PROGRESS NOTES
Jackie Aguero is a 52 y.o. female  Chief Complaint   Patient presents with    New Patient     Consult      Visit Vitals  /78 (BP Patient Position: Sitting)   Pulse 85   Temp 98.5 °F (36.9 °C) (Temporal)   Resp 16   Ht 5' 7\" (1.702 m)   Wt 277 lb (125.6 kg)   SpO2 100%   BMI 43.38 kg/m²     Weight Loss Metrics 9/28/2022 9/28/2022 10/15/2018 10/4/2018 9/17/2018 8/17/2018 8/9/2018   Pre op / Initial Wt 277 - - - - - -   Today's Wt - 277 lb 256 lb 261 lb 4 oz 261 lb 260 lb 262 lb   BMI - 43.38 kg/m2 40.1 kg/m2 40.92 kg/m2 43.43 kg/m2 43.27 kg/m2 41.65 kg/m2   Ideal Body Wt 140 - - - - - -   Excess Body Wt 137 - - - - - -   Goal Wt 167 - - - - - -   Wt loss to date 0 - - - - - -   % Wt Loss 0 - - - - - -   80% .6 - - - - - -     Body mass index is 43.38 kg/m².

## 2022-09-28 NOTE — LETTER
9/28/2022    Patient: Mary Jones   YOB: 1975   Date of Visit: 9/28/2022     [phone]    Dear Paige Robbins MD    Thank you for the kind referral of Mary Jones to the [facility] [address]. I saw her for initial consultation today. I think she would be an excellent candidate for bariatric surgery, and as such, have enrolled her in our preoperative pathway. My practice is to refer all preoperative patients who meet screening criteria for colon cancer for colonoscopy, and Ally Fernandez was amenable to this. Please feel free to reach out to me with any further questions.     Sincerely,    Carrol Marin MD, FACS, [facility]  Minimally Invasive Bariatric/General Surgery  [facility]

## 2022-09-29 LAB — UREA BREATH TEST QL: NEGATIVE

## 2022-10-04 ENCOUNTER — CLINICAL SUPPORT (OUTPATIENT)
Dept: SURGERY | Age: 47
End: 2022-10-04

## 2022-10-04 VITALS
WEIGHT: 282 LBS | HEIGHT: 67 IN | TEMPERATURE: 97.5 F | OXYGEN SATURATION: 99 % | SYSTOLIC BLOOD PRESSURE: 129 MMHG | DIASTOLIC BLOOD PRESSURE: 76 MMHG | HEART RATE: 80 BPM | BODY MASS INDEX: 44.26 KG/M2

## 2022-10-04 DIAGNOSIS — Z12.11 ENCOUNTER FOR SCREENING COLONOSCOPY: Primary | ICD-10-CM

## 2022-10-04 NOTE — PROGRESS NOTES
Rafy Castro is a 52 y.o. female (: 1975) presenting to address:    Patient states has not had Diarrhea recently and has never had a colonoscopy. Chief Complaint   Patient presents with    Other     Colon screening       Medication list and allergies have been reviewed with Rafy Castro and updated as of today's date. I have gone over all Medical, Surgical and Social History with Rafy Castro and updated/added the information accordingly. 1. Have you been to the ER, Urgent Care or Hospitalized since your last visit? NO      2. Have you followed up with your PCP or any other Physicians since your procedure/ last office visit?    NO

## 2022-10-07 ENCOUNTER — DOCUMENTATION ONLY (OUTPATIENT)
Dept: BARIATRICS/WEIGHT MGMT | Age: 47
End: 2022-10-07

## 2022-10-07 ENCOUNTER — HOSPITAL ENCOUNTER (OUTPATIENT)
Dept: BARIATRICS/WEIGHT MGMT | Age: 47
Discharge: HOME | End: 2022-10-07

## 2022-10-07 NOTE — PROGRESS NOTES
[address] [facility] [place] STEFANY Angelina [address] [facility], [address]    Patient's Name: Alix Carvalho   Age: 52 y.o. YOB: 1975   Sex: female      Insurance:              Session: 1 of  3  Surgeon:  Dr. Daniela Kay    Height: 5 f 7   Weight:    282      Lbs. BMI:    Pounds Lost since last month: 0                 Pounds Gained since last month: 5    Starting Weight: 277     Previous Months Weight: 277  Overall Pounds Lost: 0   Overall Pounds Gained: 5    Patient has not attended a support group meeting. Do you smoke? None    Alcohol intake:  Number of drinks at a time:  None  Number of times a week: None    Class Guidelines    Guidelines are reviewed with patient at the start of every class. 1. Patient understands that weight loss trial classes must be consecutive. Patient understands if they miss a class, it is their responsibility to contact me to reschedule class. I will reach out to patient after their first no show. 2.  Patient understands the expectations that weight maintenance/weight loss is expected during the classes. Failure to demonstrate changes may result in one extra month of weight loss trial, followed by going back to see the surgeon. 3. Patient is also instructed to be doing their labs, blood work, psych visit, support group and any other test that the surgeon has used while they are working on their weight loss trial.  4. Patient is instructed to bring their education binder to all classes. Changes Made Since Last Class: None    Eating Habits and Behaviors      Today we reviewed key diet principles. We talked about patient following a low calorie/low carbohydrate diet while they are in weight loss trials. To achieve this, patient is encouraged to avoid liquid calories, including alcohol, soda, sweet tea, and fruit juices. Patient can cut carbohydrates by trying to stick to meat and vegetables.   Patient can also eat eggs, cheese, and good fat, while trying to eliminate starches, such as pasta, rice, crackers, chips, popcorn. I also gave a power point that included 21 Ways to Stay on Track with a Healthy Lifestyle. Some of the food-related suggestions included drinking plenty of water or calorie-free beverages prior to their meal.  Patient is encouraged to to fill up on protein first, which is the ultimate fill-me up food. We talked about the importance of eating breakfast and the effects that can happen if one skips meals, which includes eating larger portions, snacking more, and decreasing their metabolism. With the suggestions in the power point, patient will be able to decrease their calories and carbohydrate intake. Patient's dietary habits include:    - Patient is eating 1 meals per day. I have emphasized the importance of trying to eat within 1 hour of waking and having a cut off time in the evening. Meals are made up of mixture of protein, vegetables, and carbohydrates . Addressed to patient that the focus should be on protein and vegetables. Protein will help to burn more calories and keep them jordan throughout the day. Portions are:  regular size. I have encouraged patient to use a smaller plate to measure their portions. Patient's frequency of fast food is: 3 x  week  Patient's frequency of carry out is: 1 x a week  Patient's intake of sit down: 2 x a month  We talked in class about the importance of planning ahead and trying to do more meal prep at home, so intake of eating out can be decreased. Patient is eating carbohydrates: Every day  Patient was instructed to cut out starches and keep under 75 grams of carbohydrates per day. Reviewed what portions of carbohydrates are  Patient is snacking on chips and cookies. This is being done: daily. I have talked to patient about some lower carbohydrate snack choices that focused more protein. Patient's sweet intake is: 3-4 x a week. We talked about label reading and cutting out simple sugar. Patient is drinking 40 ounces of fluid per day. Fluid intake is make up of: water and sweet tea. I have encouraged patient to cut out liquid calories and focus on non-caloric, non-carbonated drinks. Physical Activity/Exercise    Comments: We talked about the importance of establishing a work out routine. Patient is currently not doing anything for activity. Goals have been set. Behavior Modification       Comments:   Some of the behavior tips that were included in the power point, include being choosy about night time snacking. Patient was encouraged to make the TV a no eating zone and not eat after 7 pm.  Patient is also encouraged to keep a food journal.      One of the other things we talked about during class is whether or not patient has a support system.         Goals set by Registered Dietitian:  Get more exercise in  Get more sleep     Shaista Kelly. Cristela Fernandez [address]  10/7/2022

## 2022-11-03 ENCOUNTER — ANESTHESIA EVENT (OUTPATIENT)
Dept: ENDOSCOPY | Age: 47
End: 2022-11-03
Payer: COMMERCIAL

## 2022-11-03 NOTE — PERIOP NOTES
PRE-SURGICAL INSTRUCTIONS        Patient's Name:  Yen Morrissey      QTRSD'H Date:  11/3/2022              Surgery Date:  11/4/2022                Do NOT eat or drink anything, including candy, gum, or ice chips after midnight on 11/4/22, unless you have specific instructions from your surgeon or anesthesia provider to do so. You may brush your teeth before coming to the hospital.  No smoking 24 hours prior to the day of surgery. No alcohol 24 hours prior to the day of surgery. No recreational drugs for one week prior to the day of surgery. Leave all valuables, including money/purse, at home. Remove all jewelry, nail polish, acrylic nails, and makeup (including mascara); no lotions powders, deodorant, or perfume/cologne/after shave on the skin. Follow instruction for Hibiclens washes and CHG wipes from surgeon's office. Glasses/contact lenses and dentures may be worn to the hospital.  They will be removed prior to surgery. Call your doctor if symptoms of a cold or illness develop within 24-48 hours prior to your surgery. 11.  If you are having an outpatient procedure, please make arrangements for a responsible ADULT TO [address] [place] [address] and stay with you for 24 hours after your surgery. 12. ONE VISITOR in the hospital at this time for outpatient procedures. Exceptions may be made for surgical admissions, per nursing unit guidelines      Special Instructions:      Bring list of CURRENT medications. Bring any pertinent legal medical records:  Covid vaccine card  Take these medications the morning of surgery with a sip of water:  none    Complete bowel prep per MD instructions. On the day of surgery, come in the main entrance of [facility]. Let the  at the desk know you are there for surgery. A staff member will come escort you to the surgical area on the second floor.     If you have any questions or concerns, please do not hesitate to call:     (Prior to the day of surgery) [facility] department:  [phone]   (Day of surgery) Pre-Op department:  [phone]    These surgical instructions were reviewed with Carito Bryson during the [facility] phone call.

## 2022-11-04 ENCOUNTER — HOSPITAL ENCOUNTER (OUTPATIENT)
Age: 47
Setting detail: OUTPATIENT SURGERY
Discharge: HOME | End: 2022-11-04
Attending: SURGERY | Admitting: SURGERY
Payer: COMMERCIAL

## 2022-11-04 ENCOUNTER — ANESTHESIA (OUTPATIENT)
Dept: ENDOSCOPY | Age: 47
End: 2022-11-04
Payer: COMMERCIAL

## 2022-11-04 VITALS
DIASTOLIC BLOOD PRESSURE: 73 MMHG | SYSTOLIC BLOOD PRESSURE: 115 MMHG | HEIGHT: 67 IN | HEART RATE: 66 BPM | OXYGEN SATURATION: 99 % | WEIGHT: 274 LBS | TEMPERATURE: 98.2 F | BODY MASS INDEX: 43 KG/M2 | RESPIRATION RATE: 18 BRPM

## 2022-11-04 LAB — HCG UR QL: NEGATIVE

## 2022-11-04 PROCEDURE — 00812 ANES LWR INTST SCR COLSC: CPT | Performed by: STUDENT IN AN ORGANIZED HEALTH CARE EDUCATION/TRAINING PROGRAM

## 2022-11-04 PROCEDURE — 77030008565 HC TBNG SUC IRR ERBE -B: Performed by: SURGERY

## 2022-11-04 PROCEDURE — 77030013992 HC SNR POLYP ENDOSC BSC -B: Performed by: SURGERY

## 2022-11-04 PROCEDURE — 76040000019: Performed by: SURGERY

## 2022-11-04 PROCEDURE — 76060000031 HC ANESTHESIA FIRST 0.5 HR: Performed by: SURGERY

## 2022-11-04 PROCEDURE — 74011250636 HC RX REV CODE- 250/636: Performed by: NURSE ANESTHETIST, CERTIFIED REGISTERED

## 2022-11-04 PROCEDURE — 45385 COLONOSCOPY W/LESION REMOVAL: CPT | Performed by: SURGERY

## 2022-11-04 PROCEDURE — 74011000250 HC RX REV CODE- 250: Performed by: NURSE ANESTHETIST, CERTIFIED REGISTERED

## 2022-11-04 PROCEDURE — 88305 TISSUE EXAM BY PATHOLOGIST: CPT

## 2022-11-04 PROCEDURE — C1729 CATH, DRAINAGE: HCPCS | Performed by: SURGERY

## 2022-11-04 PROCEDURE — 81025 URINE PREGNANCY TEST: CPT

## 2022-11-04 PROCEDURE — 2709999900 HC NON-CHARGEABLE SUPPLY: Performed by: SURGERY

## 2022-11-04 RX ORDER — PROPOFOL 10 MG/ML
INJECTION, EMULSION INTRAVENOUS AS NEEDED
Status: DISCONTINUED | OUTPATIENT
Start: 2022-11-04 | End: 2022-11-04 | Stop reason: HOSPADM

## 2022-11-04 RX ORDER — ONDANSETRON 2 MG/ML
4 INJECTION INTRAMUSCULAR; INTRAVENOUS
Status: DISCONTINUED | OUTPATIENT
Start: 2022-11-04 | End: 2022-11-04 | Stop reason: HOSPADM

## 2022-11-04 RX ORDER — SODIUM CHLORIDE 0.9 % (FLUSH) 0.9 %
5-40 SYRINGE (ML) INJECTION AS NEEDED
Status: DISCONTINUED | OUTPATIENT
Start: 2022-11-04 | End: 2022-11-04 | Stop reason: HOSPADM

## 2022-11-04 RX ORDER — SODIUM CHLORIDE 0.9 % (FLUSH) 0.9 %
5-40 SYRINGE (ML) INJECTION EVERY 8 HOURS
Status: DISCONTINUED | OUTPATIENT
Start: 2022-11-04 | End: 2022-11-04 | Stop reason: HOSPADM

## 2022-11-04 RX ORDER — DIPHENHYDRAMINE HYDROCHLORIDE 50 MG/ML
12.5 INJECTION, SOLUTION INTRAMUSCULAR; INTRAVENOUS
Status: DISCONTINUED | OUTPATIENT
Start: 2022-11-04 | End: 2022-11-04 | Stop reason: HOSPADM

## 2022-11-04 RX ORDER — SODIUM CHLORIDE, SODIUM LACTATE, POTASSIUM CHLORIDE, CALCIUM CHLORIDE 600; 310; 30; 20 MG/100ML; MG/100ML; MG/100ML; MG/100ML
100 INJECTION, SOLUTION INTRAVENOUS CONTINUOUS
Status: DISCONTINUED | OUTPATIENT
Start: 2022-11-04 | End: 2022-11-04 | Stop reason: HOSPADM

## 2022-11-04 RX ORDER — SODIUM CHLORIDE, SODIUM LACTATE, POTASSIUM CHLORIDE, CALCIUM CHLORIDE 600; 310; 30; 20 MG/100ML; MG/100ML; MG/100ML; MG/100ML
75 INJECTION, SOLUTION INTRAVENOUS CONTINUOUS
Status: DISCONTINUED | OUTPATIENT
Start: 2022-11-04 | End: 2022-11-04 | Stop reason: HOSPADM

## 2022-11-04 RX ORDER — LIDOCAINE HYDROCHLORIDE 20 MG/ML
INJECTION, SOLUTION EPIDURAL; INFILTRATION; INTRACAUDAL; PERINEURAL AS NEEDED
Status: DISCONTINUED | OUTPATIENT
Start: 2022-11-04 | End: 2022-11-04 | Stop reason: HOSPADM

## 2022-11-04 RX ADMIN — LIDOCAINE HYDROCHLORIDE 40 MG: 20 INJECTION, SOLUTION EPIDURAL; INFILTRATION; INTRACAUDAL; PERINEURAL at 07:32

## 2022-11-04 RX ADMIN — PROPOFOL 25 MG: 10 INJECTION, EMULSION INTRAVENOUS at 07:38

## 2022-11-04 RX ADMIN — PROPOFOL 25 MG: 10 INJECTION, EMULSION INTRAVENOUS at 07:40

## 2022-11-04 RX ADMIN — PROPOFOL 25 MG: 10 INJECTION, EMULSION INTRAVENOUS at 07:48

## 2022-11-04 RX ADMIN — PROPOFOL 50 MG: 10 INJECTION, EMULSION INTRAVENOUS at 07:46

## 2022-11-04 RX ADMIN — PROPOFOL 100 MG: 10 INJECTION, EMULSION INTRAVENOUS at 07:32

## 2022-11-04 RX ADMIN — FAMOTIDINE 20 MG: 10 INJECTION, SOLUTION INTRAVENOUS at 07:09

## 2022-11-04 RX ADMIN — PROPOFOL 25 MG: 10 INJECTION, EMULSION INTRAVENOUS at 07:50

## 2022-11-04 RX ADMIN — PROPOFOL 25 MG: 10 INJECTION, EMULSION INTRAVENOUS at 07:42

## 2022-11-04 RX ADMIN — PROPOFOL 25 MG: 10 INJECTION, EMULSION INTRAVENOUS at 07:44

## 2022-11-04 RX ADMIN — SODIUM CHLORIDE, POTASSIUM CHLORIDE, SODIUM LACTATE AND CALCIUM CHLORIDE 75 ML/HR: 600; 310; 30; 20 INJECTION, SOLUTION INTRAVENOUS at 07:06

## 2022-11-04 RX ADMIN — PROPOFOL 50 MG: 10 INJECTION, EMULSION INTRAVENOUS at 07:35

## 2022-11-04 NOTE — DISCHARGE INSTRUCTIONS
Discharge Instructions     Colonoscopy: What to Expect     After a colonoscopy you will need to be driven home by a friend or family member since you will still be recovering from sedation. It is also recommended that you have someone with you for the first 24 hours after you leave the hospital.    If you are experiencing nausea, your doctor may prescribe appropriate medication to help alleviate your symptoms. During the first 24 hours after your procedure you should:    -resume normal diet, unless otherwise directed  -refrain from driving or operating heavy machinery  -take any pain medications or stool softeners that have been prescribed by your doctor  -drink plenty of liquids  -avoid consuming alcohol  -rest  -avoid strenuous or heavy lifting    After 24 hours you can resume normal activities and eating habits. However, if polyps were removed during your procedure, you will need to change your activities for the next several days. Avoid running, heavy lifting, and unnecessary travel.     Complications rarely arise after colonoscopy, but call your doctor immediately or go to your nearest emergency room if you experience any of the following symptoms:    -difficulty urinating or moving your bowels  -stools that are black or bloody  -difficulty breathing  -blood or bile in vomit  -abdominal tenderness          Arianna Cox DO, MPH  Cell phone: [phone] [facility]  [address] [place] [address]  Office phone: [phone]  Fax: [phone]               DISCHARGE SUMMARY from Nurse    PATIENT INSTRUCTIONS:    After general anesthesia or intravenous sedation, for 24 hours or while taking prescription Narcotics:  Limit your activities  Do not drive and operate hazardous machinery  Do not make important personal or business decisions  Do  not drink alcoholic beverages  If you have not urinated within 8 hours after discharge, please contact your surgeon on call.    Report the following to your surgeon:  Excessive pain, swelling, redness or odor of or around the surgical area  Temperature over 100.5  Nausea and vomiting lasting longer than 4 hours or if unable to take medications  Any signs of decreased circulation or nerve impairment to extremity: change in color, persistent  numbness, tingling, coldness or increase pain  Any questions      These are general instructions for a healthy lifestyle:    No smoking/ No tobacco products/ Avoid exposure to second hand smoke  Surgeon General's Warning:  Quitting smoking now greatly reduces serious risk to your health. Obesity, smoking, and sedentary lifestyle greatly increases your risk for illness    A healthy diet, regular physical exercise & weight monitoring are important for maintaining a healthy lifestyle    You may be retaining fluid if you have a history of heart failure or if you experience any of the following symptoms:  Weight gain of 3 pounds or more overnight or 5 pounds in a week, increased swelling in our hands or feet or shortness of breath while lying flat in bed. Please call your doctor as soon as you notice any of these symptoms; do not wait until your next office visit. The discharge information has been reviewed with the patient and son. The patient and son verbalized understanding. Discharge medications reviewed with the patient and son and appropriate educational materials and side effects teaching were provided.   ___________________________________________________________________________________________________________________________________

## 2022-11-04 NOTE — ANESTHESIA PREPROCEDURE EVALUATION
Relevant Problems   ENDOCRINE   (+) Obesity, morbid (HCC)      PERSONAL HX & FAMILY HX OF CANCER   (+) Oral cancer (HCC)       Anesthetic History     PONV          Review of Systems / Medical History  Patient summary reviewed, nursing notes reviewed and pertinent labs reviewed    Pulmonary  Within defined limits                 Neuro/Psych   Within defined limits           Cardiovascular  Within defined limits                     GI/Hepatic/Renal  Within defined limits              Endo/Other        Morbid obesity     Other Findings              Physical Exam    Airway  Mallampati: II  TM Distance: 4 - 6 cm  Neck ROM: normal range of motion   Mouth opening: Normal     Cardiovascular    Rhythm: regular  Rate: normal         Dental  No notable dental hx       Pulmonary  Breath sounds clear to auscultation               Abdominal  Abdominal exam normal       Other Findings            Anesthetic Plan    ASA: 3  Anesthesia type: MAC          Induction: Intravenous  Anesthetic plan and risks discussed with: Patient

## 2022-11-04 NOTE — ANESTHESIA POSTPROCEDURE EVALUATION
Procedure(s):  COLONOSCOPY with Polypectomy. MAC    Anesthesia Post Evaluation      Multimodal analgesia: multimodal analgesia used between 6 hours prior to anesthesia start to PACU discharge  Patient location during evaluation: PACU  Patient participation: complete - patient participated  Level of consciousness: sleepy but conscious  Pain management: adequate  Airway patency: patent  Anesthetic complications: no  Cardiovascular status: acceptable  Respiratory status: acceptable  Hydration status: acceptable  Post anesthesia nausea and vomiting:  controlled  Final Post Anesthesia Temperature Assessment:  Normothermia (36.0-37.5 degrees C)      INITIAL Post-op Vital signs:   Vitals Value Taken Time   /59 11/04/22 0806   Temp     Pulse 75 11/04/22 0810   Resp 15 11/04/22 0810   SpO2 100 % 11/04/22 0810   Vitals shown include unvalidated device data.

## 2022-11-04 NOTE — OP NOTES
Colonoscopy with cold snare of sigmoid polyp    Patient Name: Trena Esquivel     SURGERY DATE: 22     : 1975     AGE: 52 y.o. Anesthesiologist: Anesthesiologist: Janee Mckenna MD  CRNA: Natasha Sarkar CRNA     Anesthesia: MAC     PreOp DX: Colon cancer screening [Z12.11]     PostOp DX: sigmoid colon polyp    Procedure: Colonoscopy with cold snare of sigmoid polyp    Procedure Details: After informed consent was obtained the patient was taken to the endoscopy suite and placed in the left lateral position. MAC was administered by anesthesia and titrated to effect. A digital rectal exam was performed and was normal.  The colonoscope was then inserted in the rectum and CO2 was used for insufflation. The scope was then slowly advanced to the sigmoid, descending, transverse, ascending colon and into the cecum. In the sigmoid colon a 0.7cm polyp was seen on a stalk and completely excised at its base using cold snare and sent for specimen. The ileocecal valve was visualized. Bowel preparation was excellent we had good visualization of the entire lumen throughout the procedure. The colonoscope was then slowly withdrawn back through the colon and retroflexed in the rectum. There is no evidence of additional polyps seen throughout the procedure. There was no diverticuli seen. The scope was discontinued from the patient. The patient tolerated the procedure well and was sent to recovery in stable condition.     Estimated Blood Loss:  None    Specimens: sigmoid polyp            Complications: None           Disposition: tolerated procedure well            Condition: Stable    Oksana Bedolla DO

## 2022-11-04 NOTE — H&P
CC: screening colonoscopy      Assessment: screening colonocopy    Plan: Colonoscopy    Colonoscopy is recommended for screening  and the procedure and expected benefits were explained to the patient. Alternative screening options have been described. Inherent risks of the procedure have been discussed including bleeding, infection and the perforation of the colon that may require surgery to correct. She has demonstrated a good understanding of the procedure, benefits, and alternatives and would like to proceed. The bowel preparation instructions have been reviewed with the patient and consent was obtained    HPI:  Noble Duong is a 52 y.o. female who is referred by Dr. Catalina Ochoa for colonoscopy. She has not had any change to bowel habits recently. Specifically, no constipation, diarrhea, melena or hematochezia. Weight is stable. Family history of colon cancer is none. Their last colonoscopy was none. Allergies: Allergies   Allergen Reactions    Pcn [Penicillins] Hives       Medication Review:  No current facility-administered medications on file prior to encounter. Current Outpatient Medications on File Prior to Encounter   Medication Sig Dispense Refill    OTHER B/C Powder as PRN      ibuprofen (MOTRIN) 800 mg tablet Take 1 Tab by mouth every eight (8) hours as needed for Pain. Indications: Pain 40 Tab 0       System Review:  Review of Systems   Constitutional:  Negative for fatigue, fever and unexpected weight change. Gastrointestinal:  Negative for abdominal distention, abdominal pain and anal bleeding.      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 Kwame Hussein then full excision by Dr. 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 No pertinent family history. Physical exam:  Visit Vitals  /81 (BP 1 Location: Right upper arm, BP Patient Position: At rest)   Pulse 66   Temp 98 °F (36.7 °C)   Resp 17   Ht 5' 7\" (1.702 m)   Wt 124.3 kg (274 lb)   LMP 10/28/2022 (Exact Date)   SpO2 100%   Breastfeeding No   BMI 42.91 kg/m²    BMI: [unfilled]    Physical Exam  Constitutional:       Appearance: Normal appearance. She is obese. Eyes:      Extraocular Movements: Extraocular movements intact. Conjunctiva/sclera: Conjunctivae normal.      Pupils: Pupils are equal, round, and reactive to light. Cardiovascular:      Rate and Rhythm: Normal rate. Pulmonary:      Effort: Pulmonary effort is normal.   Abdominal:      General: Abdomen is flat. Bowel sounds are normal.      Palpations: Abdomen is soft. Neurological:      Mental Status: She is alert. Psychiatric:         Mood and Affect: Mood normal.         Thought Content: Thought content normal.       I have reviewed the information entered by the clinical staff and/or patient and verified it as accurate or edited where necessary.     Electronically Signed by:    Sam Pappas DO, MPH

## 2022-11-05 NOTE — PROGRESS NOTES
(see acute respiratory failure)   Abigail Meek. Midkiff Covert, FNP-C  PROGRESS NOTE    Name: Sarah Cancer MRN: [id number]   : 1975 [facility]   Date: 10/15/2018 Admission Date: No admission date for patient encounter. Subjective:  Ms. Oniel Valentine is a very pleasant 49-year-old AA female who is a patient of Dr. Earl Funes. She is not quite 2 weeks out from robot-assisted recurrent incisional hernia repair with mesh done 2018. This is her third recurrence for incisional hernia repair. She complains of pain with activity and she states that she recently ran out of her Percocet. She has not been using ice or Motrin or an abdominal binder. She has a physical job and is able to stay out for 11 weeks which I advised to likely be the best idea given her current scenario. She is eating well, sleeping well, denies constipation and has been observing activity restrictions. She denies fever or chills and states there has been no drainage from her incisions. Objective:  Vitals:    10/15/18 1554   BP: 124/70   Pulse: 89   Resp: 16   Temp: 98.6 °F (37 °C)   Weight: 116.1 kg (256 lb)   Height: 5' 7\" (1.702 m)        Physical Exam:   General:  Well developed well nourished female in no apparent distress   Abdomen: abdomen is soft with  tenderness. No masses, organomegaly or   Guarding. Incisions to left abdominal wall are clean, dry, intact. No edema, no erythema, no seroma, no drainage. Labs:  No results found for this or any previous visit (from the past 24 hour(s)). Current Medications:  Current Outpatient Prescriptions   Medication Sig Dispense Refill    ibuprofen (MOTRIN) 800 mg tablet Take 1 Tab by mouth every eight (8) hours as needed for Pain. Indications: Pain 40 Tab 0    oxyCODONE-acetaminophen (PERCOCET) 5-325 mg per tablet Take 1 Tab by mouth every four (4) hours as needed for Pain. Max Daily Amount: 6 Tabs. 24 Tab 0       Chart and notes reviewed. Data reviewed. I have evaluated and examined the patient. IMPRESSION:   · Patient not quite 2 weeks out from robot-assisted recurrent incisional hernia repair with some continued discomfort. PLAN:/DISCUSION:   · 800 mg Motrin  · Use ice to area of discomfort 20 minutes 3-4 times daily  · Add use of girdle or abdominal binder  · Work note will be revised to reflect a return to work date of December 17  · Follow-up December 14  · Please call with any questions or concerns prior to next visit     Ms. Jason Auguste has a reminder for a \"due or due soon\" health maintenance. I have asked that she contact her primary care provider for follow-up on this health maintenance. Natalia Jane.  Mila Mazariegos, SHINE-C

## 2022-11-08 ENCOUNTER — DOCUMENTATION ONLY (OUTPATIENT)
Dept: BARIATRICS/WEIGHT MGMT | Age: 47
End: 2022-11-08

## 2022-11-08 NOTE — PROGRESS NOTES
11/8/22:  Patient did not show for her nutrition visit. She was left a voicemail to call me if she was still interested in surgery.     Luis Lopes MS RD

## 2022-11-11 ENCOUNTER — TELEPHONE (OUTPATIENT)
Dept: SURGERY | Age: 47
End: 2022-11-11

## 2022-11-11 NOTE — TELEPHONE ENCOUNTER
Called to notify patient tubular adenoma on colonoscopy completely removed. Recommended repeat in 5 years.  She agrees with this plan    Jose veliz

## 2022-11-17 ENCOUNTER — OFFICE VISIT (OUTPATIENT)
Dept: SURGERY | Age: 47
End: 2022-11-17
Payer: COMMERCIAL

## 2022-11-17 VITALS
HEART RATE: 95 BPM | WEIGHT: 277 LBS | SYSTOLIC BLOOD PRESSURE: 134 MMHG | RESPIRATION RATE: 16 BRPM | DIASTOLIC BLOOD PRESSURE: 78 MMHG | HEIGHT: 67 IN | BODY MASS INDEX: 43.47 KG/M2 | OXYGEN SATURATION: 99 % | TEMPERATURE: 97.6 F

## 2022-11-17 DIAGNOSIS — E66.01 MORBID OBESITY (HCC): ICD-10-CM

## 2022-11-17 DIAGNOSIS — Z01.818 PRE-OP TESTING: ICD-10-CM

## 2022-11-17 DIAGNOSIS — Z71.89 ENCOUNTER FOR PRE-BARIATRIC SURGERY COUNSELING AND EDUCATION: Primary | ICD-10-CM

## 2022-11-17 PROCEDURE — 99213 OFFICE O/P EST LOW 20 MIN: CPT | Performed by: REGISTERED NURSE

## 2022-11-17 NOTE — PROGRESS NOTES
Bariatric Preoperative Progress Note    Chief Complaint   Patient presents with    Follow-up     Midtrial       Subjective:     Claudetta Saa is a 52 y.o. female who presents today for follow up of their candidacy for bariatric surgery. Since last seen, Claudetta Saa has been working through our bariatric program towards gastric sleeve with Dr. Nadia Murillo. 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 Niru Mclaughlin then full excision by Dr. 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 Indications: Pain 40 Tab 0       Allergies   Allergen Reactions    Pcn [Penicillins] Hives       ROS:  Review of Systems   Constitutional:  Negative for malaise/fatigue and weight loss. Respiratory:  Negative for cough and shortness of breath. Cardiovascular:  Negative for chest pain and palpitations. Gastrointestinal:  Negative for abdominal pain. Musculoskeletal:  Negative for joint pain. Neurological:  Negative for dizziness and headaches.        Objective:     Physical Exam:  Visit Vitals  /78 (BP 1 Location: Left upper arm, BP Patient Position: Sitting)   Pulse 95   Temp 97.6 °F (36.4 °C) (Temporal)   Resp 16   Ht 5' 7\" (1.702 m)   Wt 125.6 kg (277 lb)   LMP 10/28/2022 (Exact Date)   SpO2 99%   BMI 43.38 kg/m²       Physical Exam  Vitals and nursing note reviewed. Constitutional:       Appearance: She is obese. HENT:      Head: Normocephalic and atraumatic. Eyes:      Pupils: Pupils are equal, round, and reactive to light. Cardiovascular:      Rate and Rhythm: Normal rate. Pulmonary:      Effort: Pulmonary effort is normal.   Musculoskeletal:         General: Normal range of motion. Neurological:      Mental Status: She is alert and oriented to person, place, and time. Psychiatric:         Mood and Affect: Mood normal.         Behavior: Behavior normal.         Thought Content: Thought content normal.       Studies to date:    Labs: Pending  H pylori 9/28/2022: Negative     UGI: Ordered    Colonoscopy: 11/4/2022    Sleep study: Needs to schedule    Nutritional evaluation: Completed 1 of 3 with Han Mart RD    Psychiatric evaluation: Needs to schedule    Support Group: December 2022    Additional Evaluations:     Assessment:   Marcellus Hernandez is a 52 y.o. female who is progressing through the bariatric preoperative evaluation. At this time, she is an appropriate candidate for weight loss surgery pending below requirements. Plan:   -Complete remainder of preop evaluation including labs, support group, WLT classes, psychiatric evaluation, sleep study, and UGI.   -Patient communicates understanding that the expectation is to lose or maintain weight during WLT. Weight gain may result in delay or cancellation of surgery.   -Follow up once she has completed entirety of weight loss workup to determine next steps.       Yari Diaz NP

## 2023-01-31 DIAGNOSIS — Z12.31 ENCOUNTER FOR SCREENING MAMMOGRAM FOR MALIGNANT NEOPLASM OF BREAST: Primary | ICD-10-CM

## 2023-02-03 DIAGNOSIS — Z12.31 ENCOUNTER FOR SCREENING MAMMOGRAM FOR MALIGNANT NEOPLASM OF BREAST: Primary | ICD-10-CM

## 2023-12-27 ENCOUNTER — TRANSCRIBE ORDERS (OUTPATIENT)
Facility: HOSPITAL | Age: 48
End: 2023-12-27

## 2023-12-27 DIAGNOSIS — N64.4 BREAST PAIN, LEFT: Primary | ICD-10-CM

## 2024-01-15 ENCOUNTER — HOSPITAL ENCOUNTER (OUTPATIENT)
Facility: HOSPITAL | Age: 49
Discharge: HOME | End: 2024-01-18
Payer: COMMERCIAL

## 2024-01-15 DIAGNOSIS — N64.4 BREAST PAIN, LEFT: ICD-10-CM

## 2024-01-15 DIAGNOSIS — N64.4 MASTODYNIA: ICD-10-CM

## 2024-01-15 PROCEDURE — 76642 ULTRASOUND BREAST LIMITED: CPT

## 2024-01-15 PROCEDURE — G0279 TOMOSYNTHESIS, MAMMO: HCPCS

## (undated) DEVICE — SYRINGE MED 25GA 3ML L5/8IN SUBQ PLAS W/ DETACH NDL SFTY

## (undated) DEVICE — SYR 10ML LUER LOK 1/5ML GRAD --

## (undated) DEVICE — SYR 20ML LL STRL LF --

## (undated) DEVICE — SUTURE MCRYL SZ 4-0 L18IN ABSRB UD L19MM PS-2 3/8 CIR PRIM Y496G

## (undated) DEVICE — (D)PREP SKN CHLRAPRP APPL 26ML -- CONVERT TO ITEM 371833

## (undated) DEVICE — SNARE ENDOSCP POLYP 2.4 MM 240 CM 10 MM 2.8 MM CAPTIVATOR

## (undated) DEVICE — OBTRTR BLDELSS 8MM DISP -- DA VINCI - SNGL USE

## (undated) DEVICE — CATHETER SUCT TR FL TIP 14FR W/ O CTRL

## (undated) DEVICE — Device

## (undated) DEVICE — CANNULA ORIG TL CLR W FOAM CUSHIONS AND 14FT SUPL TB 3 CHN

## (undated) DEVICE — DERMABOND SKIN ADH 0.7ML -- DERMABOND ADVANCED 12/BX

## (undated) DEVICE — TIP COVER ACCESSORY

## (undated) DEVICE — LIGHT HANDLE: Brand: DEVON

## (undated) DEVICE — TRAP SPEC COLL POLYP POLYSTYR --

## (undated) DEVICE — SEAL CANN F/12MM 8.5-13MM DISP -- DA VINCI

## (undated) DEVICE — SUTURE ABSRB L6IN L37MM 0 GS-21 GRN 1/2 CIR TAPR PNT NDL VLOCL0306

## (undated) DEVICE — CATHETER THOR 36FR DIA10.7MM POLYVI CHL TRCR TIP STR SFT

## (undated) DEVICE — INTENDED FOR TISSUE SEPARATION, AND OTHER PROCEDURES THAT REQUIRE A SHARP SURGICAL BLADE TO PUNCTURE OR CUT.: Brand: BARD-PARKER ® CARBON RIB-BACK BLADES

## (undated) DEVICE — ENDOSCOPY PUMP TUBING/ CAP SET: Brand: ERBE

## (undated) DEVICE — SUTURE VCRL SZ 2-0 L27IN ABSRB UD L26MM SH 1/2 CIR J417H

## (undated) DEVICE — SPONGE GZ W2XL2IN 12 PLY 100% WVN COT PRE COUNTED

## (undated) DEVICE — LINER SUCT CANSTR 3000CC PLAS SFT PRE ASSEMB W/OUT TBNG W/

## (undated) DEVICE — SUTURE VLOC 90 2/0 VL 6 GS-22 VLOCM2105

## (undated) DEVICE — SUTURE ABSRB L12IN L12MM SZ 2-0 GS-22 VLT GLYCOLIDE VLOCM2115

## (undated) DEVICE — SOLUTION IRRIG 1000ML H2O STRL BLT

## (undated) DEVICE — 3M™ TEGADERM™ TRANSPARENT FILM DRESSING FRAME STYLE, 1624W, 2-3/8 IN X 2-3/4 IN (6 CM X 7 CM), 100/CT 4CT/CASE: Brand: 3M™ TEGADERM™

## (undated) DEVICE — ELECTRO LUBE IS A SINGLE PATIENT USE DEVICE THAT IS INTENDED TO BE USED ON ELECTROSURGICAL ELECTRODES TO REDUCE STICKING.: Brand: KEY SURGICAL ELECTRO LUBE

## (undated) DEVICE — ANTI-FOG SOLUTION WITH FOAM PAD: Brand: DEVON

## (undated) DEVICE — CANNULA NSL AD TBNG L14FT STD PVC O2 CRV CONN NONFLARED NSL

## (undated) DEVICE — REM POLYHESIVE ADULT PATIENT RETURN ELECTRODE: Brand: VALLEYLAB

## (undated) DEVICE — SYR 50ML SLIP TIP NSAF LF STRL --

## (undated) DEVICE — SOL IRR NACL 0.9% 500ML POUR --

## (undated) DEVICE — MEDI-VAC NON-CONDUCTIVE SUCTION TUBING: Brand: CARDINAL HEALTH

## (undated) DEVICE — GAUZE,SPONGE,4"X4",16PLY,STRL,LF,10/TRAY: Brand: MEDLINE

## (undated) DEVICE — GOWN ISOL IMPERV UNIV, DISP, OPEN BACK, BLUE --

## (undated) DEVICE — DRAPE SURG EQUIP W105XH13XL20IN 3 ARM DISPOSABLE DA VINCI S

## (undated) DEVICE — SOL ANTI-FOG 6ML MEDC -- MEDICHOICE - CONVERT TO 358427

## (undated) DEVICE — STERILE POLYISOPRENE POWDER-FREE SURGICAL GLOVES: Brand: PROTEXIS

## (undated) DEVICE — KENDALL SCD EXPRESS SLEEVES, KNEE LENGTH, MEDIUM: Brand: KENDALL SCD

## (undated) DEVICE — YANKAUER,SMOOTH HANDLE,HIGH CAPACITY: Brand: MEDLINE INDUSTRIES, INC.

## (undated) DEVICE — FLUFF AND POLYMER UNDERPAD,EXTRA HEAVY: Brand: WINGS